# Patient Record
Sex: FEMALE | Race: BLACK OR AFRICAN AMERICAN | Employment: OTHER | URBAN - METROPOLITAN AREA
[De-identification: names, ages, dates, MRNs, and addresses within clinical notes are randomized per-mention and may not be internally consistent; named-entity substitution may affect disease eponyms.]

---

## 2017-01-06 ENCOUNTER — THERAPY VISIT (OUTPATIENT)
Dept: PHYSICAL THERAPY | Facility: CLINIC | Age: 62
End: 2017-01-06
Payer: COMMERCIAL

## 2017-01-06 DIAGNOSIS — M25.571 ACUTE RIGHT ANKLE PAIN: Primary | ICD-10-CM

## 2017-01-06 DIAGNOSIS — M65.28 CALCIFIC ACHILLES TENDONITIS: ICD-10-CM

## 2017-01-06 PROCEDURE — 97035 APP MDLTY 1+ULTRASOUND EA 15: CPT | Mod: GP | Performed by: PHYSICAL THERAPIST

## 2017-01-06 PROCEDURE — 97140 MANUAL THERAPY 1/> REGIONS: CPT | Mod: GP | Performed by: PHYSICAL THERAPIST

## 2017-01-06 PROCEDURE — 97110 THERAPEUTIC EXERCISES: CPT | Mod: GP | Performed by: PHYSICAL THERAPIST

## 2017-01-20 ENCOUNTER — THERAPY VISIT (OUTPATIENT)
Dept: PHYSICAL THERAPY | Facility: CLINIC | Age: 62
End: 2017-01-20
Payer: COMMERCIAL

## 2017-01-20 DIAGNOSIS — M25.571 ACUTE RIGHT ANKLE PAIN: Primary | ICD-10-CM

## 2017-01-20 DIAGNOSIS — M65.28 CALCIFIC ACHILLES TENDONITIS: ICD-10-CM

## 2017-01-20 PROCEDURE — 97035 APP MDLTY 1+ULTRASOUND EA 15: CPT | Mod: GP | Performed by: PHYSICAL THERAPIST

## 2017-01-20 PROCEDURE — 97110 THERAPEUTIC EXERCISES: CPT | Mod: GP | Performed by: PHYSICAL THERAPIST

## 2017-01-20 PROCEDURE — 97140 MANUAL THERAPY 1/> REGIONS: CPT | Mod: GP | Performed by: PHYSICAL THERAPIST

## 2017-02-02 ENCOUNTER — THERAPY VISIT (OUTPATIENT)
Dept: PHYSICAL THERAPY | Facility: CLINIC | Age: 62
End: 2017-02-02
Payer: COMMERCIAL

## 2017-02-02 DIAGNOSIS — M25.571 ACUTE RIGHT ANKLE PAIN: Primary | ICD-10-CM

## 2017-02-02 DIAGNOSIS — M65.28 CALCIFIC ACHILLES TENDONITIS: ICD-10-CM

## 2017-02-02 PROCEDURE — 97035 APP MDLTY 1+ULTRASOUND EA 15: CPT | Mod: GP | Performed by: PHYSICAL THERAPIST

## 2017-02-02 PROCEDURE — 97140 MANUAL THERAPY 1/> REGIONS: CPT | Mod: GP | Performed by: PHYSICAL THERAPIST

## 2017-02-02 PROCEDURE — 97110 THERAPEUTIC EXERCISES: CPT | Mod: GP | Performed by: PHYSICAL THERAPIST

## 2017-02-14 ENCOUNTER — THERAPY VISIT (OUTPATIENT)
Dept: PHYSICAL THERAPY | Facility: CLINIC | Age: 62
End: 2017-02-14
Payer: COMMERCIAL

## 2017-02-14 DIAGNOSIS — M65.28 CALCIFIC ACHILLES TENDONITIS: ICD-10-CM

## 2017-02-14 DIAGNOSIS — M25.571 ACUTE RIGHT ANKLE PAIN: ICD-10-CM

## 2017-02-14 PROCEDURE — 97112 NEUROMUSCULAR REEDUCATION: CPT | Mod: GP | Performed by: PHYSICAL THERAPIST

## 2017-02-14 PROCEDURE — 97140 MANUAL THERAPY 1/> REGIONS: CPT | Mod: GP | Performed by: PHYSICAL THERAPIST

## 2017-02-14 PROCEDURE — 97035 APP MDLTY 1+ULTRASOUND EA 15: CPT | Mod: GP | Performed by: PHYSICAL THERAPIST

## 2017-02-24 ENCOUNTER — THERAPY VISIT (OUTPATIENT)
Dept: PHYSICAL THERAPY | Facility: CLINIC | Age: 62
End: 2017-02-24
Payer: COMMERCIAL

## 2017-02-24 DIAGNOSIS — M65.28 CALCIFIC ACHILLES TENDONITIS: ICD-10-CM

## 2017-02-24 DIAGNOSIS — M25.571 ACUTE RIGHT ANKLE PAIN: ICD-10-CM

## 2017-02-24 PROCEDURE — 97112 NEUROMUSCULAR REEDUCATION: CPT | Mod: GP | Performed by: PHYSICAL THERAPIST

## 2017-02-24 PROCEDURE — 97035 APP MDLTY 1+ULTRASOUND EA 15: CPT | Mod: GP | Performed by: PHYSICAL THERAPIST

## 2017-02-24 PROCEDURE — 97140 MANUAL THERAPY 1/> REGIONS: CPT | Mod: GP | Performed by: PHYSICAL THERAPIST

## 2019-08-06 ENCOUNTER — THERAPY VISIT (OUTPATIENT)
Dept: PHYSICAL THERAPY | Facility: CLINIC | Age: 64
End: 2019-08-06
Payer: COMMERCIAL

## 2019-08-06 DIAGNOSIS — M54.50 LEFT-SIDED LOW BACK PAIN WITHOUT SCIATICA: ICD-10-CM

## 2019-08-06 DIAGNOSIS — M54.6 LEFT-SIDED THORACIC BACK PAIN: ICD-10-CM

## 2019-08-06 PROCEDURE — 97530 THERAPEUTIC ACTIVITIES: CPT | Mod: GP | Performed by: PHYSICAL THERAPIST

## 2019-08-06 PROCEDURE — 97161 PT EVAL LOW COMPLEX 20 MIN: CPT | Mod: GP | Performed by: PHYSICAL THERAPIST

## 2019-08-06 PROCEDURE — 97110 THERAPEUTIC EXERCISES: CPT | Mod: GP | Performed by: PHYSICAL THERAPIST

## 2019-08-06 NOTE — LETTER
[place] [facility] [place] [address] [facility] [address]  [phone]    2019    Re: Caroline Rashid   :   1955  MRN:  [id number]   REFERRING PHYSICIAN:   Gadiel Evans    [facility]  Date of Initial Evaluation:  19  Visits:  Rxs Used: 1  Reason for Referral:     Left-sided thoracic back pain  Left-sided low back pain without sciatica    EVALUATION SUMMARY  Lumbar Spine Evaluation  Physical Therapy Initial Examination/Evaluation 2019   Caroline Rashid is a 64 year old female referred to physical therapy by Dr. Gadiel Evans for treatment of Chronic L-sided Low Back Pain  with Precautions/Restrictions/MD instructions E&T up to 6 visits   Therapist Assessment:   Clinical Impression: Pt presents to [facility] with primary complaint of L-sided back pain.  Per clinical examination, pt with limited ROM and hesitancy to move. Pt does have decreased core and proximal mm strength.    Pt will benefit from skilled physical therapy for stretching and strengthening program as well as education on proper body mechanics to address above impairments and functional limitations.       Subjective:  Pt reports back pain has been present since . She had anxiety and panic attack at this time. Pain is in L thoracic back mostly.  DOI/onset: 2019   Mechanism of injury: Panic attack/anxiety    DOS NA   Related PMH: none  Previous treatment: PT for R ankle   Imaging: none    Chief Complaint: L sided back pain    Pain: rest 6 /10, activity 9/10  Described as: spreading out, can be sharp Alleviated by: warm towel, stretching, massage, muscle cream, shower  Exacerbated by: lifting movement  Progression of symptoms since initial onset: staying the same Time of day when pain is worse: night    Sleeping: Pain wakes her at night ; mostly side sleeper   Social history: , grandchildren   Occupation: Retired Job duties: housework     Current HEP/exercise regimen: walking, normal household activities   Patient's goals are Decrease pain, return to PLOF   Other pertinent PMH: Depression, htn, menopausal, migraines/headaches, sleep disorder, severe headaches, pain at night/rest General health as reported by patient: Fair    Return to MD: evita    Re: Caroline Rashid   :   1955    Static Posture  Foot: Pes planus/cavus: Slight planus on the R    Knee: Varus/Valgus: unremarkable     Hip: Adduction/IR: unremarkable     L foot in front so pt standing in rotated position     Dynamic Movement Screen  Single leg stance observations: required UE support   Double limb squat observations: NT    Trunk Range of Motion  Flexion: WNL  Extension: 50%  Side bendin% L, WNL to the R   Rotation: 50% bilat  Hamstring: hypertonic bilat    Hip and Knee Strength   MMT Hip Abduction Hip Extension Hip Flexion  Knee Flexion   Left 4-/5 NT 4/5 4+/5   Right 4-/5 NT 4/5 4+/5     Special Tests  Neural tension: Slump test + on the L   Reflexes: NT  Dermatomes: WNL  Myotomes: WNL    Assessment/Plan:    Patient is a 64 year old female with thoracic and lumbar complaints.    Patient has the following significant findings with corresponding treatment plan.                Diagnosis 1:  Chronic L-sided Low back Pain  Pain -  hot/cold therapy, manual therapy, self management, education and home program  Decreased ROM/flexibility - manual therapy, therapeutic exercise, therapeutic activity and home program  Decreased strength - therapeutic exercise, therapeutic activities and home program  Impaired balance - neuro re-education, therapeutic activities and home program  Impaired muscle performance - neuro re-education and home program  Decreased function - therapeutic activities and home program  Impaired posture - neuro re-education, therapeutic activities and home program    Therapy Evaluation Codes:   1) History comprised of:   Personal factors that impact the plan of care:       Anxiety, Living environment, Past/current experiences and Time since onset of symptoms.    Comorbidity factors that impact the plan of care are:      Depression, High blood pressure, Menopausal, Migraines/headaches and Pain at night/rest.    Re: Caroline Rashid   :   1955     Medications impacting care: High blood pressure, Muscle relaxant, Pain and Sleep.  2) Examination of Body Systems comprised of:   Body structures and functions that impact the plan of care:      Lumbar spine and Thoracic Spine.   Activity limitations that impact the plan of care are:      Bathing, Bending, Cooking, Dressing, Lifting, Sitting, Stairs, Standing, Walking and Sleeping.  3) Clinical presentation characteristics are:   Stable/Uncomplicated.  4) Decision-Making    Low complexity using standardized patient assessment instrument and/or measureable assessment of functional outcome.  Cumulative Therapy Evaluation is: Low complexity.    Previous and current functional limitations:  (See Goal Flow Sheet for this information)    Short term and Long term goals: (See Goal Flow Sheet for this information)     Communication ability:  Patient appears to be able to clearly communicate and understand verbal and written communication and follow directions correctly.  Treatment Explanation - The following has been discussed with the patient:   RX ordered/plan of care  Anticipated outcomes  Possible risks and side effects  This patient would benefit from PT intervention to resume normal activities.   Rehab potential is good.    Frequency:  1 X week, once daily  Duration:  for 4 weeks  Discharge Plan:  Achieve all LTG.  Independent in home treatment program.  Reach maximal therapeutic benefit.    Please refer to the daily flowsheet for treatment today, total treatment time and time spent performing 1:1 timed codes.     Thank you for your referral.    INQUIRIES  Therapist: Jyoti Brothers, PT, DPT  [facility]  [address] [address]  Phone: [phone]  Fax: [phone]

## 2019-08-06 NOTE — PROGRESS NOTES
[facility]  System  Physical Exam  General   ROS     Lumbar Spine Evaluation    Physical Therapy Initial Examination/Evaluation August 6, 2019   Caroline Rashid is a 64 year old female referred to physical therapy by Dr. Gadiel Evans for treatment of Chronic L-sided Low Back Pain  with Precautions/Restrictions/MD instructions E&T up to 6 visits   Therapist Assessment:   Clinical Impression: Pt presents to [facility] with primary complaint of L-sided back pain.  Per clinical examination, pt with limited ROM and hesitancy to move. Pt does have decreased core and proximal mm strength.    Pt will benefit from skilled physical therapy for stretching and strengthening program as well as education on proper body mechanics to address above impairments and functional limitations.       Subjective:  Pt reports back pain has been present since 6/19. She had anxiety and panic attack at this time. Pain is in L thoracic back mostly.  DOI/onset: 6/19/2019   Mechanism of injury: Panic attack/anxiety    DOS NA   Related PMH: none  Previous treatment: PT for R ankle   Imaging: none    Chief Complaint: L sided back pain    Pain: rest 6 /10, activity 9/10  Described as: spreading out, can be sharp Alleviated by: warm towel, stretching, massage, muscle cream, shower  Exacerbated by: lifting movement  Progression of symptoms since initial onset: staying the same Time of day when pain is worse: night    Sleeping: Pain wakes her at night ; mostly side sleeper   Social history: , grandchildren   Occupation: Retired Job duties: housework    Current HEP/exercise regimen: walking, normal household activities   Patient's goals are Decrease pain, return to PLOF   Other pertinent PMH: Depression, htn, menopausal, migraines/headaches, sleep disorder, severe headaches, pain at night/rest General health as reported by patient: Fair    Return to MD: prn      Static Posture  Foot: Pes planus/cavus: Slight planus on the R    Knee:  Varus/Valgus: unremarkable     Hip: Adduction/IR: unremarkable     L foot in front so pt standing in rotated position     Dynamic Movement Screen  Single leg stance observations: required UE support   Double limb squat observations: NT    Trunk Range of Motion  Flexion: WNL  Extension: 50%  Side bendin% L, WNL to the R   Rotation: 50% bilat  Hamstring: hypertonic bilat      Hip and Knee Strength   MMT Hip Abduction Hip Extension Hip Flexion  Knee Flexion   Left 4-/5 NT 4/5 4+/5   Right 4-/5 NT 4/5 4+/5     Special Tests  Neural tension: Slump test + on the L   Reflexes: NT  Dermatomes: WNL  Myotomes: WNL      Assessment/Plan:    Patient is a 64 year old female with thoracic and lumbar complaints.    Patient has the following significant findings with corresponding treatment plan.                Diagnosis 1:  Chronic L-sided Low back Pain  Pain -  hot/cold therapy, manual therapy, self management, education and home program  Decreased ROM/flexibility - manual therapy, therapeutic exercise, therapeutic activity and home program  Decreased strength - therapeutic exercise, therapeutic activities and home program  Impaired balance - neuro re-education, therapeutic activities and home program  Impaired muscle performance - neuro re-education and home program  Decreased function - therapeutic activities and home program  Impaired posture - neuro re-education, therapeutic activities and home program    Therapy Evaluation Codes:   1) History comprised of:   Personal factors that impact the plan of care:      Anxiety, Living environment, Past/current experiences and Time since onset of symptoms.    Comorbidity factors that impact the plan of care are:      Depression, High blood pressure, Menopausal, Migraines/headaches and Pain at night/rest.     Medications impacting care: High blood pressure, Muscle relaxant, Pain and Sleep.  2) Examination of Body Systems comprised of:   Body structures and functions that impact the  plan of care:      Lumbar spine and Thoracic Spine.   Activity limitations that impact the plan of care are:      Bathing, Bending, Cooking, Dressing, Lifting, Sitting, Stairs, Standing, Walking and Sleeping.  3) Clinical presentation characteristics are:   Stable/Uncomplicated.  4) Decision-Making    Low complexity using standardized patient assessment instrument and/or measureable assessment of functional outcome.  Cumulative Therapy Evaluation is: Low complexity.    Previous and current functional limitations:  (See Goal Flow Sheet for this information)    Short term and Long term goals: (See Goal Flow Sheet for this information)     Communication ability:  Patient appears to be able to clearly communicate and understand verbal and written communication and follow directions correctly.  Treatment Explanation - The following has been discussed with the patient:   RX ordered/plan of care  Anticipated outcomes  Possible risks and side effects  This patient would benefit from PT intervention to resume normal activities.   Rehab potential is good.    Frequency:  1 X week, once daily  Duration:  for 4 weeks  Discharge Plan:  Achieve all LTG.  Independent in home treatment program.  Reach maximal therapeutic benefit.    Please refer to the daily flowsheet for treatment today, total treatment time and time spent performing 1:1 timed codes.

## 2019-09-10 ENCOUNTER — THERAPY VISIT (OUTPATIENT)
Dept: PHYSICAL THERAPY | Facility: CLINIC | Age: 64
End: 2019-09-10
Payer: COMMERCIAL

## 2019-09-10 DIAGNOSIS — M54.6 LEFT-SIDED THORACIC BACK PAIN: ICD-10-CM

## 2019-09-10 DIAGNOSIS — M54.50 LEFT-SIDED LOW BACK PAIN WITHOUT SCIATICA: ICD-10-CM

## 2019-09-10 PROCEDURE — 97112 NEUROMUSCULAR REEDUCATION: CPT | Mod: GP | Performed by: PHYSICAL THERAPIST

## 2019-09-10 PROCEDURE — 97110 THERAPEUTIC EXERCISES: CPT | Mod: GP | Performed by: PHYSICAL THERAPIST

## 2019-09-23 ENCOUNTER — THERAPY VISIT (OUTPATIENT)
Dept: PHYSICAL THERAPY | Facility: CLINIC | Age: 64
End: 2019-09-23
Payer: COMMERCIAL

## 2019-09-23 DIAGNOSIS — M54.50 LEFT-SIDED LOW BACK PAIN WITHOUT SCIATICA: ICD-10-CM

## 2019-09-23 DIAGNOSIS — M54.6 LEFT-SIDED THORACIC BACK PAIN: ICD-10-CM

## 2019-09-23 PROCEDURE — 97112 NEUROMUSCULAR REEDUCATION: CPT | Mod: GP | Performed by: PHYSICAL THERAPIST

## 2019-09-23 PROCEDURE — 97110 THERAPEUTIC EXERCISES: CPT | Mod: GP | Performed by: PHYSICAL THERAPIST

## 2019-10-14 ENCOUNTER — THERAPY VISIT (OUTPATIENT)
Dept: PHYSICAL THERAPY | Facility: CLINIC | Age: 64
End: 2019-10-14
Payer: COMMERCIAL

## 2019-10-14 DIAGNOSIS — M54.50 LEFT-SIDED LOW BACK PAIN WITHOUT SCIATICA: ICD-10-CM

## 2019-10-14 DIAGNOSIS — M54.6 LEFT-SIDED THORACIC BACK PAIN: ICD-10-CM

## 2019-10-14 PROCEDURE — 97110 THERAPEUTIC EXERCISES: CPT | Mod: GP | Performed by: PHYSICAL THERAPIST

## 2019-10-14 PROCEDURE — 97112 NEUROMUSCULAR REEDUCATION: CPT | Mod: GP | Performed by: PHYSICAL THERAPIST

## 2019-10-29 ENCOUNTER — THERAPY VISIT (OUTPATIENT)
Dept: PHYSICAL THERAPY | Facility: CLINIC | Age: 64
End: 2019-10-29
Payer: COMMERCIAL

## 2019-10-29 DIAGNOSIS — M54.6 LEFT-SIDED THORACIC BACK PAIN: ICD-10-CM

## 2019-10-29 DIAGNOSIS — M54.50 LEFT-SIDED LOW BACK PAIN WITHOUT SCIATICA: ICD-10-CM

## 2019-10-29 PROCEDURE — 97530 THERAPEUTIC ACTIVITIES: CPT | Mod: GP | Performed by: PHYSICAL THERAPIST

## 2019-10-29 PROCEDURE — 97110 THERAPEUTIC EXERCISES: CPT | Mod: GP | Performed by: PHYSICAL THERAPIST

## 2019-11-11 ENCOUNTER — THERAPY VISIT (OUTPATIENT)
Dept: PHYSICAL THERAPY | Facility: CLINIC | Age: 64
End: 2019-11-11
Payer: COMMERCIAL

## 2019-11-11 DIAGNOSIS — M54.50 LEFT-SIDED LOW BACK PAIN WITHOUT SCIATICA: ICD-10-CM

## 2019-11-11 DIAGNOSIS — M54.6 LEFT-SIDED THORACIC BACK PAIN: ICD-10-CM

## 2019-11-11 PROCEDURE — 97110 THERAPEUTIC EXERCISES: CPT | Mod: GP | Performed by: PHYSICAL THERAPIST

## 2019-11-11 PROCEDURE — 97530 THERAPEUTIC ACTIVITIES: CPT | Mod: GP | Performed by: PHYSICAL THERAPIST

## 2019-11-11 NOTE — PROGRESS NOTES
[facility]      System  Physical Exam  General   ROS      DISCHARGE REPORT    Progress reporting period is from 2019 to 2019.       SUBJECTIVE  Subjective: Back pain is more intermittent than it was in the past. Often pain is related to weather. Exercises do seem to give her some relief.     Current Pain level: 4/10.      Initial Pain level: 9/10.   Changes in function:  Yes (See Goal flowsheet attached for changes in current functional level)  Adverse reaction to treatment or activity: None    OBJECTIVE  Oswestry Score: 22.22 %         Changes noted in objective findings:  Yes,       Trunk Range of Motion 19  Flexion: WNL  Extension: WNL-stretch   Side bending:  WNL  Rotation: WNL  Hamstring: hypertonic bilat         Hip and Knee Strength   19                    MMT Hip Abduction Hip Extension Hip Flexion    Left 4/5 NT 4/5   Right 4+/5 NT 4+/5         Trunk Range of Motion 2019  Flexion: WNL  Extension: 50%  Side bendin% L, WNL to the R   Rotation: 50% bilat  Hamstring: hypertonic bilat        Hip and Knee Strength    2019                    MMT Hip Abduction Hip Extension Hip Flexion  Knee Flexion   Left 4-/5 NT 4/5 4+/5   Right 4-/5 NT 4/5 4+/5       Objective: Reviewed HEP. Improved ROM and pain from start of PT.   Discussed recommendation to join gym to continue to build strength and endurance over the winter.      ASSESSMENT/PLAN    STG/LTGs have been met or progress has been made towards goals:  Yes (See Goal flow sheet completed today.)  Assessment of Progress: The patient's condition is improving.  Self Management Plans:  Patient has been instructed in a home treatment program.  Caroline continues to require the following intervention to meet STG and LTG's:  PT intervention is no longer required to meet STG/LTG.    Recommendations:  This patient is ready to be discharged from therapy and continue their home treatment program.    Please refer to the daily flowsheet for treatment  today, total treatment time and time spent performing 1:1 timed codes.

## 2019-11-11 NOTE — LETTER
[place] [facility] [place] [address] [facility] [place] [address]  [phone]    2019    Re: Caroline Rashid   :   1955  MRN:  [id number]   REFERRING PHYSICIAN:   Gadiel Evans    [facility]  Date of Initial Evaluation:  19  Visits:  Rxs Used: 6  Reason for Referral:     Left-sided thoracic back pain  Left-sided low back pain without sciatica    DISCHARGE REPORT  Progress reporting period is from 2019 to 2019.       SUBJECTIVE  Subjective: Back pain is more intermittent than it was in the past. Often pain is related to weather. Exercises do seem to give her some relief.     Current Pain level: /10.      Initial Pain level: /10.   Changes in function:  Yes (See Goal flowsheet attached for changes in current functional level)  Adverse reaction to treatment or activity: None    OBJECTIVE  Oswestry Score: 22.22 %         Changes noted in objective findings:  Yes,     Trunk Range of Motion 19  Flexion: WNL  Extension: WNL-stretch   Side bending:  WNL  Rotation: WNL  Hamstring: hypertonic bilat      Hip and Knee Strength   19                    MMT Hip Abduction Hip Extension Hip Flexion    Left 4/5 NT 4/5   Right 4+/5 NT 4+/5     Trunk Range of Motion 2019  Flexion: WNL  Extension: 50%  Side bendin% L, WNL to the R   Rotation: 50% bilat  Hamstring: hypertonic bilat      Hip and Knee Strength    2019                    MMT Hip Abduction Hip Extension Hip Flexion  Knee Flexion   Left 4-/5 NT 4/5 4+/5   Right 4-/5 NT 4/5 4+/5     Re: Caroline Rashid   :   1955    Objective: Reviewed HEP. Improved ROM and pain from start of PT.   Discussed recommendation to join gym to continue to build strength and endurance over the winter.      ASSESSMENT/PLAN  STG/LTGs have been met or progress has been made towards goals:  Yes (See Goal flow sheet completed today.)  Assessment of Progress: The patient's condition  is improving.  Self Management Plans:  Patient has been instructed in a home treatment program.  Caroline continues to require the following intervention to meet STG and LTG's:  PT intervention is no longer required to meet STG/LTG.    Recommendations:  This patient is ready to be discharged from therapy and continue their home treatment program.    Please refer to the daily flowsheet for treatment today, total treatment time and time spent performing 1:1 timed codes.    Thank you for your referral.    INQUIRIES  Therapist: Jyoti Brothers PT  [facility] [address] [address]  Phone: [phone]  Fax: [phone]

## 2020-09-21 ENCOUNTER — TRANSFERRED RECORDS (OUTPATIENT)
Dept: HEALTH INFORMATION MANAGEMENT | Facility: CLINIC | Age: 65
End: 2020-09-21

## 2020-09-23 ENCOUNTER — HOSPITAL ENCOUNTER (INPATIENT)
Facility: CLINIC | Age: 65
LOS: 7 days | Discharge: HOME HEALTH | DRG: 947 | End: 2020-09-30
Attending: PHYSICAL MEDICINE & REHABILITATION | Admitting: PHYSICAL MEDICINE & REHABILITATION
Payer: COMMERCIAL

## 2020-09-23 DIAGNOSIS — R53.81 DEBILITY: Primary | ICD-10-CM

## 2020-09-23 DIAGNOSIS — E11.69 TYPE 2 DIABETES MELLITUS WITH OTHER SPECIFIED COMPLICATION, UNSPECIFIED WHETHER LONG TERM INSULIN USE (H): ICD-10-CM

## 2020-09-23 DIAGNOSIS — Z86.16 HISTORY OF 2019 NOVEL CORONAVIRUS DISEASE (COVID-19): ICD-10-CM

## 2020-09-23 LAB — GLUCOSE BLDC GLUCOMTR-MCNC: 135 MG/DL (ref 70–99)

## 2020-09-23 PROCEDURE — 00000146 ZZHCL STATISTIC GLUCOSE BY METER IP

## 2020-09-23 PROCEDURE — 97166 OT EVAL MOD COMPLEX 45 MIN: CPT | Mod: GO | Performed by: OCCUPATIONAL THERAPIST

## 2020-09-23 PROCEDURE — 25000132 ZZH RX MED GY IP 250 OP 250 PS 637: Performed by: PHYSICIAN ASSISTANT

## 2020-09-23 PROCEDURE — 12800006 ZZH R&B REHAB

## 2020-09-23 PROCEDURE — 25000128 H RX IP 250 OP 636: Performed by: PHYSICIAN ASSISTANT

## 2020-09-23 PROCEDURE — 25000131 ZZH RX MED GY IP 250 OP 636 PS 637: Performed by: PHYSICIAN ASSISTANT

## 2020-09-23 RX ORDER — LANOLIN ALCOHOL/MO/W.PET/CERES
100 CREAM (GRAM) TOPICAL DAILY
Status: ON HOLD | COMMUNITY
End: 2020-09-30

## 2020-09-23 RX ORDER — METOPROLOL TARTRATE 25 MG/1
25 TABLET, FILM COATED ORAL 2 TIMES DAILY
Status: DISCONTINUED | OUTPATIENT
Start: 2020-09-23 | End: 2020-09-30 | Stop reason: HOSPADM

## 2020-09-23 RX ORDER — POTASSIUM CHLORIDE 1500 MG/1
20 TABLET, EXTENDED RELEASE ORAL DAILY
Status: ON HOLD | COMMUNITY
End: 2020-09-30

## 2020-09-23 RX ORDER — DULOXETIN HYDROCHLORIDE 20 MG/1
40 CAPSULE, DELAYED RELEASE ORAL DAILY
Status: DISCONTINUED | OUTPATIENT
Start: 2020-09-24 | End: 2020-09-30 | Stop reason: HOSPADM

## 2020-09-23 RX ORDER — LANOLIN ALCOHOL/MO/W.PET/CERES
6 CREAM (GRAM) TOPICAL AT BEDTIME
COMMUNITY

## 2020-09-23 RX ORDER — GUANFACINE 1 MG/1
0.5 TABLET ORAL 2 TIMES DAILY
Status: ON HOLD | COMMUNITY
End: 2020-09-30

## 2020-09-23 RX ORDER — VENLAFAXINE 37.5 MG/1
37.5 TABLET ORAL DAILY
Status: ON HOLD | COMMUNITY
End: 2020-09-30

## 2020-09-23 RX ORDER — DULOXETIN HYDROCHLORIDE 20 MG/1
20 CAPSULE, DELAYED RELEASE ORAL DAILY
Status: DISCONTINUED | OUTPATIENT
Start: 2020-09-24 | End: 2020-09-23

## 2020-09-23 RX ORDER — DEXTROSE MONOHYDRATE 25 G/50ML
25-50 INJECTION, SOLUTION INTRAVENOUS
Status: DISCONTINUED | OUTPATIENT
Start: 2020-09-23 | End: 2020-09-30 | Stop reason: HOSPADM

## 2020-09-23 RX ORDER — ACETAMINOPHEN 325 MG/1
650 TABLET ORAL EVERY 6 HOURS PRN
Status: DISCONTINUED | OUTPATIENT
Start: 2020-09-23 | End: 2020-09-30 | Stop reason: HOSPADM

## 2020-09-23 RX ORDER — LANOLIN ALCOHOL/MO/W.PET/CERES
100 CREAM (GRAM) TOPICAL DAILY
Status: DISCONTINUED | OUTPATIENT
Start: 2020-09-24 | End: 2020-09-30 | Stop reason: HOSPADM

## 2020-09-23 RX ORDER — LIDOCAINE 4 G/G
1 PATCH TOPICAL 3 TIMES DAILY
Status: ON HOLD | COMMUNITY
End: 2020-09-30

## 2020-09-23 RX ORDER — NICOTINE POLACRILEX 4 MG
15-30 LOZENGE BUCCAL
Status: DISCONTINUED | OUTPATIENT
Start: 2020-09-23 | End: 2020-09-30 | Stop reason: HOSPADM

## 2020-09-23 RX ORDER — POTASSIUM CHLORIDE 750 MG/1
20 TABLET, EXTENDED RELEASE ORAL DAILY
Status: DISCONTINUED | OUTPATIENT
Start: 2020-09-24 | End: 2020-09-29

## 2020-09-23 RX ORDER — HYDROXYZINE HYDROCHLORIDE 25 MG/1
25 TABLET, FILM COATED ORAL EVERY 6 HOURS PRN
Status: DISCONTINUED | OUTPATIENT
Start: 2020-09-23 | End: 2020-09-30 | Stop reason: HOSPADM

## 2020-09-23 RX ORDER — LANOLIN ALCOHOL/MO/W.PET/CERES
6 CREAM (GRAM) TOPICAL AT BEDTIME
Status: DISCONTINUED | OUTPATIENT
Start: 2020-09-23 | End: 2020-09-30 | Stop reason: HOSPADM

## 2020-09-23 RX ORDER — LORAZEPAM 0.5 MG/1
0.5 TABLET ORAL ONCE
Status: ON HOLD | COMMUNITY
End: 2020-09-30

## 2020-09-23 RX ORDER — FUROSEMIDE 20 MG
20 TABLET ORAL DAILY
Status: DISCONTINUED | OUTPATIENT
Start: 2020-09-24 | End: 2020-09-29

## 2020-09-23 RX ORDER — ACETAMINOPHEN 325 MG/1
650 TABLET ORAL EVERY 6 HOURS PRN
Status: ON HOLD | COMMUNITY
End: 2020-09-30

## 2020-09-23 RX ORDER — CALCIUM CARBONATE 500 MG/1
1 TABLET, CHEWABLE ORAL ONCE
Status: ON HOLD | COMMUNITY
End: 2020-09-30

## 2020-09-23 RX ORDER — TRAZODONE HYDROCHLORIDE 50 MG/1
50 TABLET, FILM COATED ORAL
Status: ON HOLD | COMMUNITY
End: 2020-09-30

## 2020-09-23 RX ORDER — QUETIAPINE FUMARATE 25 MG/1
25 TABLET, FILM COATED ORAL
Status: ON HOLD | COMMUNITY
End: 2020-09-30

## 2020-09-23 RX ORDER — MIRTAZAPINE 15 MG/1
15 TABLET, FILM COATED ORAL AT BEDTIME
Status: ON HOLD | COMMUNITY
End: 2020-09-30

## 2020-09-23 RX ORDER — LIDOCAINE 4 G/G
1-2 PATCH TOPICAL
Status: DISCONTINUED | OUTPATIENT
Start: 2020-09-24 | End: 2020-09-30 | Stop reason: HOSPADM

## 2020-09-23 RX ORDER — POLYETHYLENE GLYCOL 3350 17 G/17G
17 POWDER, FOR SOLUTION ORAL DAILY PRN
Status: DISCONTINUED | OUTPATIENT
Start: 2020-09-23 | End: 2020-09-30 | Stop reason: HOSPADM

## 2020-09-23 RX ORDER — FUROSEMIDE 20 MG
20 TABLET ORAL DAILY
Status: ON HOLD | COMMUNITY
End: 2020-09-30

## 2020-09-23 RX ORDER — MIRTAZAPINE 7.5 MG/1
15 TABLET, FILM COATED ORAL AT BEDTIME
Status: DISCONTINUED | OUTPATIENT
Start: 2020-09-23 | End: 2020-09-30 | Stop reason: HOSPADM

## 2020-09-23 RX ORDER — DULOXETIN HYDROCHLORIDE 20 MG/1
20 CAPSULE, DELAYED RELEASE ORAL DAILY
Status: ON HOLD | COMMUNITY
End: 2020-09-30

## 2020-09-23 RX ORDER — METOPROLOL TARTRATE 25 MG/1
25 TABLET, FILM COATED ORAL 2 TIMES DAILY
Status: ON HOLD | COMMUNITY
End: 2020-09-30

## 2020-09-23 RX ORDER — ASPIRIN 81 MG/1
81 TABLET, CHEWABLE ORAL DAILY
Status: ON HOLD | COMMUNITY
Start: 2020-09-22 | End: 2020-09-30

## 2020-09-23 RX ORDER — VENLAFAXINE 37.5 MG/1
37.5 TABLET ORAL DAILY
Status: DISCONTINUED | OUTPATIENT
Start: 2020-09-24 | End: 2020-09-23

## 2020-09-23 RX ORDER — INSULIN LISPRO 100 [IU]/ML
0-12 INJECTION, SOLUTION INTRAVENOUS; SUBCUTANEOUS EVERY 6 HOURS
Status: ON HOLD | COMMUNITY
End: 2020-09-30

## 2020-09-23 RX ORDER — ASPIRIN 81 MG/1
81 TABLET ORAL DAILY
Status: DISCONTINUED | OUTPATIENT
Start: 2020-09-24 | End: 2020-09-30 | Stop reason: HOSPADM

## 2020-09-23 RX ADMIN — Medication 0.5 MG: at 21:57

## 2020-09-23 RX ADMIN — ACETAMINOPHEN 650 MG: 325 TABLET, FILM COATED ORAL at 23:05

## 2020-09-23 RX ADMIN — MELATONIN TAB 3 MG 6 MG: 3 TAB at 21:57

## 2020-09-23 RX ADMIN — MIRTAZAPINE 15 MG: 7.5 TABLET, FILM COATED ORAL at 21:57

## 2020-09-23 RX ADMIN — INSULIN GLARGINE 20 UNITS: 100 INJECTION, SOLUTION SUBCUTANEOUS at 22:00

## 2020-09-23 RX ADMIN — ENOXAPARIN SODIUM 90 MG: 100 INJECTION SUBCUTANEOUS at 20:44

## 2020-09-23 ASSESSMENT — ACTIVITIES OF DAILY LIVING (ADL): PREVIOUS_RESPONSIBILITIES: MEAL PREP;HOUSEKEEPING;LAUNDRY;SHOPPING;MEDICATION MANAGEMENT;FINANCES;CHILD CARE

## 2020-09-23 ASSESSMENT — MIFFLIN-ST. JEOR: SCORE: 1422.24

## 2020-09-23 NOTE — PLAN OF CARE
Pt was admitted to unit around 1310 via W/C, accompanied by . Pt is alert and oriented but forgetful. VSS. Denies pain. Required CGA, gait belt walker for mobility. Continent of bowel and bladder. LBM today per report. Room and unit orientation completed and pt ambulated in pina x1 for unit tour.

## 2020-09-23 NOTE — H&P
"    [facility]   Acute Rehabilitation Unit  Admission History and Physical    CHIEF COMPLAINT   \"weakness\"    HISTORY OF PRESENT ILLNESS  Caroline Rashid is a 65 year old woman with a past medical history of htn, DMII, who was admitted 8/ for hypoxic resp failure 2/2 to [facility]. Was intubated, paralyzed and proned. Course complicated by worsening renal function as well as NSTEMI with +trops. Pt extubated 8/8 with position changing/proning. requiried emergen reintubation. Course further complicated by evidence of Aspergillus in sputum as well as yeast in blood, Joey,  prolonged hypoxic respiratory failure, encephalopathy. She was trached 8/26/20, transferred to [facility] for ongoing management 8/31. While at [facility] was de cannulated 9/15, encephalopathy improved and was transitioned to regular diet.      During her LTACH stay, patient was seen and evaluated by PT and OT, who collectively recommended that patient would benefit from ongoing therapies in the acute inpatient rehabilitation setting.       In review of the therapy notes she is currently noted to have impaired strength, cognition, and activity tolerance. She is currently completing seated grooming with cga, min assist with dressing, min assist with transfers. Scored 10/30 on MoCA with need fur further cognitive assessment. Is ambulating with cga with walker and need for rest breaks. Goals for MOD I with adls, mobility and improved cognitive abilities.    On arrival to rehab unit patient was seen with  present. She reports she is doing pretty well, denies n/v/d, sob, headache, dizziness, and fevers. She denies numbness/tingling,does report some intermittent right hand tremor, feels right side especially right upper extremity has been slightly weaker for several weeks.  feels this is likely due to positioning while in ICU.  She reports good appetite and motivation to get home.        PAST MEDICAL " HISTORY   Reviewed and updated in Epic.  Past Medical History:   Diagnosis Date     Anxiety      Diabetes type 2, uncontrolled (H)      Hypertension        SURGICAL HISTORY  Reviewed and updated in Epic.  No past surgical history on file.    SOCIAL HISTORY  Reviewed and updated in Epic.  Marital Status:   Living situation: lives in house in [place] with , daughter, and grand daughter.  There are total of 7 steps to enter their house, then everything is main level.  Laundry is in the basement but she does not need to go to the basement for now.  Family support: supportive.   Vocational History: retired  Tobacco use: none  Alcohol use: none  Illicit drug use: none      FAMILY HISTORY  Reviewed and updated in Epic.  No family history on file.      PRIOR FUNCTIONAL HISTORY   Pt was independent with all ADLs/IADLs, transfers, mobility and gait.      MEDICATIONS  Scheduled meds  Medications Prior to Admission   Medication Sig Dispense Refill Last Dose     aspirin (ASA) 81 MG chewable tablet Take 81 mg by mouth daily   9/22/2020 at 0950     calcium carbonate (TUMS) 500 MG chewable tablet Take 1 chew tab by mouth once   9/21/2020 at 2309     DULoxetine (CYMBALTA) 20 MG capsule Take 20 mg by mouth daily   9/22/2020 at 0951     enoxaparin ANTICOAGULANT (LOVENOX) 100 MG/ML syringe Inject 90 mg Subcutaneous every 12 hours   9/22/2020 at 2104     furosemide (LASIX) 20 MG tablet Take 20 mg by mouth daily   9/22/2020 at 0949     guaiFENesin (ROBITUSSIN) 100 MG/5ML SYRP Take 10 mLs by mouth every 4 hours as needed for cough   9/21/2020 at 0509     guanFACINE (TENEX) 1 MG tablet Take 0.5 mg by mouth 2 times daily   9/22/2020 at 2104     insulin glargine (LANTUS PEN) 100 UNIT/ML pen Inject 20 Units Subcutaneous At Bedtime   9/22/2020 at 2106     insulin lispro (HUMALOG KWIKPEN) 100 UNIT/ML (1 unit dial) KWIKPEN Inject 0-12 Units Subcutaneous every 6 hours   9/23/2020 at 0033     isavuconazonium Sulfate (CRESEMBA)  "186 MG CAPS capsule Take 372 mg by mouth daily   9/22/2020 at 1126     Lidocaine (LIDOCARE) 4 % Patch Place 1 patch onto the skin 3 times daily To prevent lidocaine toxicity, patient should be patch free for 12 hrs daily.   9/22/2020 at 0942     LORazepam (ATIVAN) 0.5 MG tablet Take 0.5 mg by mouth once   9/22/2020 at 1126     melatonin 3 MG tablet Take 6 mg by mouth At Bedtime   9/22/2020 at 2103     metoprolol tartrate (LOPRESSOR) 25 MG tablet Take 25 mg by mouth 2 times daily   9/22/2020 at 2105     mirtazapine (REMERON) 15 MG tablet Take 15 mg by mouth At Bedtime   9/22/2020 at 2119     potassium chloride ER (KLOR-CON M) 20 MEQ CR tablet Take 20 mEq by mouth daily   9/22/2020 at 0950     QUEtiapine (SEROQUEL) 25 MG tablet Take 25 mg by mouth nightly as needed   9/22/2020 at 2104     thiamine (B-1) 100 MG tablet Take 100 mg by mouth daily   9/22/2020 at 0950     traZODone (DESYREL) 50 MG tablet Take 50 mg by mouth nightly as needed for sleep   9/23/2020 at 0008     venlafaxine (EFFEXOR) 37.5 MG tablet Take 37.5 mg by mouth daily   9/22/2020 at 0952     acetaminophen (TYLENOL) 325 MG tablet Take 650 mg by mouth every 6 hours as needed for mild pain or fever   9/21/2020 at 0626       ALLERGIES   No Known Allergies      REVIEW OF SYSTEMS  A 10 point ROS was performed and negative unless otherwise noted in HPI.         PHYSICAL EXAM  VITAL SIGNS:  /70 (BP Location: Left arm)   Pulse 95   Temp 97.8  F (36.6  C) (Oral)   Resp 16   Ht 1.6 m (5' 3\")   Wt 90.3 kg (199 lb 1.6 oz)   SpO2 96%   BMI 35.27 kg/m    BMI:  Estimated body mass index is 35.27 kg/m  as calculated from the following:    Height as of this encounter: 1.6 m (5' 3\").    Weight as of this encounter: 90.3 kg (199 lb 1.6 oz).     General: awake alert nad  HEENT: face symmetric mmm, eomi   Pulmonary: non labored clear diminished on room air  Cardiovascular: rrr  Abdominal: soft non distended non tender  Extremities: warm, well perfused, no edema " in bilateral lower extremities, no tenderness in calves  MSK/neuro:   Mental Status:  alert and oriented   Cranial Nerves: grossly normal    Sensory: Normal to light touch in bilateral upper and lower extremities    Strength: 4/5 in all muscle groups of the upper and lower extremities, right shoulder abduction and forward flexion up to 90 degrees with good resistance within limited range, right ef,  with full rom though seemingly and reported weaker than left    Tone per modified Tanisha Scale: none   Abnormal movements: reports rue tremor (not visualized)    Coordination: No dysmetria on finger to nose b/l, and rapid alternating finger seem symetric.   Speech: clear slow   Cognition: answers questions appropriately reports some memory impairment.    Gait: up with walker with cga  Skin: former trach site well healed, visualized skin intact.       LABS  Glucose 111-204  .   BMP 9/21 reviewed WNL.   CBC 9/21 Hgb 11.1 WBC 5.7         IMPRESSION/PLAN:  Caroline Rashid is a 65 year old right hand dominant female who was hospitalized 8/1-8/31 for COVID 19, then [facility] for vent weaning and medical management 8/31-9/23. Course complicated by hypoxic respiratory failure,aspergillus PNA, NSTEMI, CHICA, encephalopathy. Admitted to acute rehab 9/23 for ongoing rehabilitation and medical management.    Past medical history significant for anxiety, DM type 2, htn, and HLD.       Admission to acute inpatient rehab 9/23 with debilitiy  Impairment group code: 16      1. PT, OT and SLP 60 minutes of each on a daily basis, in addition to rehab nursing and close management of physiatrist.      2. Impairment of ADL's: Noted to have impaired strength, cognition, and activity tolerance with goal for mod I to I with adls.     3. Impairment of mobility:  Noted to have impaired strength, cognition, and activity tolerance with goal for mod I to I with mobility    4. Impairment of cognition/language/swallow:  Scored 10/30 on  MoCA with noted impaired memory will benefit from formal cognitive linguistic evaluation.      5. Medical Conditions  Prolonged acute respiratory failure-2/2 aspergillus and COVID.   - s/p trach 8/26 S/p decannulation 9/15  -oxygen to keep sats >90%  -wean as tolerated    COVID pneumonia  - Received remdesivir, Dexamethasone  -DVT prophylaxis as below       Aspergillus pneumonia.  CT Chest 9/21 per report with persistent fungal PNA, Case has been D/w Dr Ferrera ID from [facility] until 10/8/20  -  f/u ID clinic.     Metabolic encephalopathy  History of panic disorder, anxiety  - Prior to hospitalization on gabapentin qhs, hydroxyzine prn anxiety. Duloxetine 40 mg daily, trazodone at bedtime prn sleep, remeron 15 mg qhs. Medications titrated given encephalopathy, anxiety, and NPO status during hospitalization.   - continue cymbalta (increase to home dose 40 mg daily), discontinue effexor (now taking oral meds)   -continue remeron, melatonin, prn trazodone  -continue tenex (started for anxiety)- taper to at bedtime and continue to taper off  -psychology consult for emotional support  -consider simplification regimen/ psychiatry consult.      Right hand tremor- family reports as intermittent, new since hospital stay unclear etiology 9/22  could be anxiety related, per chart review has been on propanolol in past though no clear documentation or reported history of essential tremor.     Right shoulder limited range of motion/right upper extremity weakness- active range of motion with abduction and forward flexion at the right shoulder is limited up to 90 degrees, passive range of motion is normal.  She does not have any pain associated with this, and no sensory loss.  Less likely neurogenic and possibly due to some mechanical injury/rotator cuff tendinopathy.  Will continue therapies for now and consider referral to sports medicine/orthopedic team for more evaluation including repeat exam, and shoulder  MRI.     Chronic rt parietal ischemia- noted on CT head 9/22  -continue asa  -secondary stroke prevention     NSTEMI: during initial hospitalization while acutely ill    Echo 8/14 with normal LVEF 60%.   - ASA  - Metoprolol BID  - Lasix  Daily       Diabetes type 2- prior to admission on metformin 1000 mg bid. Did not check bg prior to admission. A1C 9.6% 8/3/30.  - Lantus 20 units at bedtime and ssi     HTN- prior to admission on lisinopril 40 mg daily, propanolol 40 mg q am,   continue lasix 20 mg daily, metoprolol 25 mg bid  -monitor bp        6. Adjustment to disability:  Clinical psychology to eval and treat  7. FEN: reg  8. Bowel: continent  9. Bladder: continent  10. DVT Prophylaxis: Lovenox at 90mg BID (start on 9/4, written by pulmonologist) for 29 days till 9/30/20   11. GI Prophylaxis: reg diet  12. Code: full  13. Disposition: home  14. ELOS: 7-8 days.  15. Rehab prognosis:  good  16. Follow up Appointments on Discharge: pcp, infectious disease    Discussed with Dr. Elkins , PM&R staff physician     Danii Caputo PA-C  Rehab Service  Pager [phone]      Physician Attestation   I, Elaine Elkins, saw and evaluated Caroline Rashid as part of a shared visit.  I have reviewed and discussed with the advanced practice provider their history, physical and plan.    I personally reviewed the vital signs, medications, labs and imaging.    My key history or physical exam findings:   Key management decisions made by me:   Caroline Rashid is a 66 yo female who was admitted to ARU with debility following prolonged and complicated hospitalization for COVID-19 infection.    Comorbid medical conditions being managed: Anemia, encephalopathy with residual cognitive deficits, STEMI, CHICA, aspiration pneumonia/sepsis acute on chronic tremor, right upper extremity weakness and limited range of motion, diabetes, hypertension and anxiety    She was independent with all aspects of her life. She is currently noted to have impaired  strength, cognition, and activity tolerance. She is currently completing seated grooming with cga, min assist with dressing, min assist with transfers. Scored 10/30 on MoCA with need fur further cognitive assessment. Is ambulating with cga with walker and need for rest breaks.     Anticipate improvement to modified level with basic ADLs and mobility; she might need supervision/assistance for IADLs pending her course and cognitive deficits.    Will benefit from intensive rehabilitation includin minutes each of PT, OT and SLP  Rehabilitation nursing  Close management by physiatry    Good rehab potentials; at risk of more medical complications. Estimated length of stay is 8 days.      Elaine Elkins  Date of Service (when I saw the patient): 20

## 2020-09-24 ENCOUNTER — APPOINTMENT (OUTPATIENT)
Dept: OCCUPATIONAL THERAPY | Facility: CLINIC | Age: 65
End: 2020-09-24
Payer: COMMERCIAL

## 2020-09-24 ENCOUNTER — APPOINTMENT (OUTPATIENT)
Dept: SPEECH THERAPY | Facility: CLINIC | Age: 65
End: 2020-09-24
Payer: COMMERCIAL

## 2020-09-24 ENCOUNTER — APPOINTMENT (OUTPATIENT)
Dept: PHYSICAL THERAPY | Facility: CLINIC | Age: 65
End: 2020-09-24
Attending: PHYSICIAN ASSISTANT
Payer: COMMERCIAL

## 2020-09-24 LAB
ALBUMIN SERPL-MCNC: 3.1 G/DL (ref 3.4–5)
ALP SERPL-CCNC: 133 U/L (ref 40–150)
ALT SERPL W P-5'-P-CCNC: 75 U/L (ref 0–50)
ANION GAP SERPL CALCULATED.3IONS-SCNC: 9 MMOL/L (ref 3–14)
AST SERPL W P-5'-P-CCNC: 40 U/L (ref 0–45)
BASOPHILS # BLD AUTO: 0 10E9/L (ref 0–0.2)
BASOPHILS NFR BLD AUTO: 0.7 %
BILIRUB DIRECT SERPL-MCNC: 0.1 MG/DL (ref 0–0.2)
BILIRUB SERPL-MCNC: 0.3 MG/DL (ref 0.2–1.3)
BUN SERPL-MCNC: 15 MG/DL (ref 7–30)
CALCIUM SERPL-MCNC: 9.4 MG/DL (ref 8.5–10.1)
CHLORIDE SERPL-SCNC: 107 MMOL/L (ref 94–109)
CO2 SERPL-SCNC: 25 MMOL/L (ref 20–32)
CREAT SERPL-MCNC: 0.76 MG/DL (ref 0.52–1.04)
DIFFERENTIAL METHOD BLD: ABNORMAL
EOSINOPHIL # BLD AUTO: 0.2 10E9/L (ref 0–0.7)
EOSINOPHIL NFR BLD AUTO: 4.5 %
ERYTHROCYTE [DISTWIDTH] IN BLOOD BY AUTOMATED COUNT: 15.8 % (ref 10–15)
GFR SERPL CREATININE-BSD FRML MDRD: 82 ML/MIN/{1.73_M2}
GLUCOSE BLDC GLUCOMTR-MCNC: 120 MG/DL (ref 70–99)
GLUCOSE BLDC GLUCOMTR-MCNC: 132 MG/DL (ref 70–99)
GLUCOSE BLDC GLUCOMTR-MCNC: 151 MG/DL (ref 70–99)
GLUCOSE BLDC GLUCOMTR-MCNC: 180 MG/DL (ref 70–99)
GLUCOSE SERPL-MCNC: 125 MG/DL (ref 70–99)
HCT VFR BLD AUTO: 34.2 % (ref 35–47)
HGB BLD-MCNC: 11.3 G/DL (ref 11.7–15.7)
IMM GRANULOCYTES # BLD: 0 10E9/L (ref 0–0.4)
IMM GRANULOCYTES NFR BLD: 0.2 %
LMWH PPP CHRO-ACNC: 1.16 IU/ML
LYMPHOCYTES # BLD AUTO: 1.8 10E9/L (ref 0.8–5.3)
LYMPHOCYTES NFR BLD AUTO: 41.8 %
MCH RBC QN AUTO: 29.4 PG (ref 26.5–33)
MCHC RBC AUTO-ENTMCNC: 33 G/DL (ref 31.5–36.5)
MCV RBC AUTO: 89 FL (ref 78–100)
MONOCYTES # BLD AUTO: 0.3 10E9/L (ref 0–1.3)
MONOCYTES NFR BLD AUTO: 6.8 %
NEUTROPHILS # BLD AUTO: 2 10E9/L (ref 1.6–8.3)
NEUTROPHILS NFR BLD AUTO: 46 %
NRBC # BLD AUTO: 0 10*3/UL
NRBC BLD AUTO-RTO: 0 /100
PLATELET # BLD AUTO: 228 10E9/L (ref 150–450)
POTASSIUM SERPL-SCNC: 4 MMOL/L (ref 3.4–5.3)
PROT SERPL-MCNC: 6.9 G/DL (ref 6.8–8.8)
RBC # BLD AUTO: 3.85 10E12/L (ref 3.8–5.2)
SODIUM SERPL-SCNC: 141 MMOL/L (ref 133–144)
WBC # BLD AUTO: 4.4 10E9/L (ref 4–11)

## 2020-09-24 PROCEDURE — 97535 SELF CARE MNGMENT TRAINING: CPT | Mod: GO | Performed by: OCCUPATIONAL THERAPIST

## 2020-09-24 PROCEDURE — 36415 COLL VENOUS BLD VENIPUNCTURE: CPT | Performed by: PHYSICIAN ASSISTANT

## 2020-09-24 PROCEDURE — 92523 SPEECH SOUND LANG COMPREHEN: CPT | Mod: GN

## 2020-09-24 PROCEDURE — 85520 HEPARIN ASSAY: CPT | Performed by: PHYSICAL MEDICINE & REHABILITATION

## 2020-09-24 PROCEDURE — 97530 THERAPEUTIC ACTIVITIES: CPT | Mod: GP

## 2020-09-24 PROCEDURE — 97530 THERAPEUTIC ACTIVITIES: CPT | Mod: GO | Performed by: OCCUPATIONAL THERAPIST

## 2020-09-24 PROCEDURE — 80076 HEPATIC FUNCTION PANEL: CPT | Performed by: PHYSICIAN ASSISTANT

## 2020-09-24 PROCEDURE — 36415 COLL VENOUS BLD VENIPUNCTURE: CPT | Performed by: PHYSICAL MEDICINE & REHABILITATION

## 2020-09-24 PROCEDURE — 97110 THERAPEUTIC EXERCISES: CPT | Mod: GP

## 2020-09-24 PROCEDURE — 97110 THERAPEUTIC EXERCISES: CPT | Mod: GO | Performed by: OCCUPATIONAL THERAPIST

## 2020-09-24 PROCEDURE — 85025 COMPLETE CBC W/AUTO DIFF WBC: CPT | Performed by: PHYSICIAN ASSISTANT

## 2020-09-24 PROCEDURE — 80048 BASIC METABOLIC PNL TOTAL CA: CPT | Performed by: PHYSICIAN ASSISTANT

## 2020-09-24 PROCEDURE — 25000131 ZZH RX MED GY IP 250 OP 636 PS 637: Performed by: PHYSICIAN ASSISTANT

## 2020-09-24 PROCEDURE — 97161 PT EVAL LOW COMPLEX 20 MIN: CPT | Mod: GP

## 2020-09-24 PROCEDURE — 12800006 ZZH R&B REHAB

## 2020-09-24 PROCEDURE — 25000128 H RX IP 250 OP 636: Performed by: PHYSICIAN ASSISTANT

## 2020-09-24 PROCEDURE — 25000132 ZZH RX MED GY IP 250 OP 250 PS 637: Performed by: PHYSICIAN ASSISTANT

## 2020-09-24 PROCEDURE — 00000146 ZZHCL STATISTIC GLUCOSE BY METER IP

## 2020-09-24 RX ADMIN — MIRTAZAPINE 15 MG: 7.5 TABLET, FILM COATED ORAL at 21:17

## 2020-09-24 RX ADMIN — ASPIRIN 81 MG: 81 TABLET, COATED ORAL at 08:15

## 2020-09-24 RX ADMIN — MELATONIN TAB 3 MG 6 MG: 3 TAB at 21:17

## 2020-09-24 RX ADMIN — POTASSIUM CHLORIDE 20 MEQ: 750 TABLET, EXTENDED RELEASE ORAL at 08:14

## 2020-09-24 RX ADMIN — ENOXAPARIN SODIUM 70 MG: 80 INJECTION SUBCUTANEOUS at 21:20

## 2020-09-24 RX ADMIN — DULOXETINE HYDROCHLORIDE 40 MG: 20 CAPSULE, DELAYED RELEASE ORAL at 08:15

## 2020-09-24 RX ADMIN — THIAMINE HCL TAB 100 MG 100 MG: 100 TAB at 08:14

## 2020-09-24 RX ADMIN — METFORMIN HYDROCHLORIDE 500 MG: 500 TABLET ORAL at 18:08

## 2020-09-24 RX ADMIN — ISAVUCONAZONIUM SULFATE 372 MG: 186 CAPSULE ORAL at 08:16

## 2020-09-24 RX ADMIN — LIDOCAINE 1 PATCH: 560 PATCH PERCUTANEOUS; TOPICAL; TRANSDERMAL at 08:19

## 2020-09-24 RX ADMIN — INSULIN ASPART 1 UNITS: 100 INJECTION, SOLUTION INTRAVENOUS; SUBCUTANEOUS at 12:06

## 2020-09-24 RX ADMIN — METOPROLOL TARTRATE 25 MG: 25 TABLET, FILM COATED ORAL at 21:17

## 2020-09-24 RX ADMIN — FUROSEMIDE 20 MG: 20 TABLET ORAL at 08:14

## 2020-09-24 RX ADMIN — METOPROLOL TARTRATE 25 MG: 25 TABLET, FILM COATED ORAL at 08:22

## 2020-09-24 RX ADMIN — ENOXAPARIN SODIUM 90 MG: 100 INJECTION SUBCUTANEOUS at 08:14

## 2020-09-24 NOTE — PLAN OF CARE
Patient has been able to participate with therapies, and has been able to use her call light appropriately.  She is able to walk in her room to the bathroom with use of a walker, and stand by assistance.         She voices that she did not have Diabetes before going in to the hospital. She would benefit from patient education on Diabetes.  Currently monitoring BGs, covering with sliding scale, with Lantus dosing in the evening, as well as oral hypoglycemic  to be given with supper

## 2020-09-24 NOTE — PLAN OF CARE
FOCUS/GOAL  Medical management    ASSESSMENT, INTERVENTIONS AND CONTINUING PLAN FOR GOAL:  Pt is alert and oriented. Verbalized headache from earlier in the evening has subsided. Assist of 1 with walker. Continent of bladder using toilet in the bathroom. Pt able to complete nicki cares independently. 0200 . Used call light appropriately and appeared to sleep well overnight.

## 2020-09-24 NOTE — PLAN OF CARE
Alert and oriented x4. Continent of bowel and bladder in toilet. Last BM 9/23. Transfers with assist of 1 with walker. QID blood sugars. Regular thin diet. On 2L oxygen with a nasal cannula. Bruising on arms and abdomen noted. Reported a headache around 11pm. Ice and acetaminophen given. Pt states it feels like a migraine headache to her, which she does get occasionally. Bed alarm on for safety, call light within reach, Ok to continue with care plan.

## 2020-09-24 NOTE — PHARMACY-MEDICATION REGIMEN REVIEW
Pharmacy Medication Regimen Review  Caroline Rashid is a 65 year old female who is currently in the Acute Rehab Unit.    Assessment: All medications have an appropriate indications, durations and no unnecessary use was found.  Enoxaparin: 70 mg subQ q12h - dosing regimen per pulmonologist per care everywhere.  Isavuvonazonium sulfate: until 10/8/20 (ID appointment)  Guanfacine: will be tapered off per H&P  PTA Lisinopril and propanolol- not restarted, currently on metoprolol and furosemide.    Plan:   Continue current treatment.  Attending provider will be sent this note for review.  If there are any emergent issues noted above, pharmacist will contact provider directly by phone.      Pharmacy will periodically review the resident's medication regimen for any PRN medications not administered in > 72 hours and discontinue them. The pharmacist will discuss gradual dose reductions of psychopharmacologic medications with interdisciplinary team on a regular basis.    Please contact pharmacy if the above does not answer specific medication questions/concerns.    Background:  A pharmacist has reviewed all medications and pertinent medical history today.  Medications were reviewed for appropriate use and any irregularities found are listed with recommendations.      Current Facility-Administered Medications:      acetaminophen (TYLENOL) tablet 650 mg, 650 mg, Oral, Q6H PRN, Danii Caputo PA, 650 mg at 09/23/20 2305     aspirin EC tablet 81 mg, 81 mg, Oral, Daily, Danii Caputo PA, 81 mg at 09/24/20 0815     glucose gel 15-30 g, 15-30 g, Oral, Q15 Min PRN **OR** dextrose 50 % injection 25-50 mL, 25-50 mL, Intravenous, Q15 Min PRN **OR** glucagon injection 1 mg, 1 mg, Subcutaneous, Q15 Min PRN, Danii Caputo PA     DULoxetine (CYMBALTA) DR capsule 40 mg, 40 mg, Oral, Daily, Danii Caputo PA, 40 mg at 09/24/20 0815     enoxaparin ANTICOAGULANT (LOVENOX) injection 90 mg, 90 mg, Subcutaneous, Q12H, Harshal  LOBO Marinelli, 90 mg at 09/24/20 0814     furosemide (LASIX) tablet 20 mg, 20 mg, Oral, Daily, Danii Caputo PA, 20 mg at 09/24/20 0814     guanFACINE (TENEX) half-tab 0.5 mg, 0.5 mg, Oral, At Bedtime, Danii Caputo PA, 0.5 mg at 09/23/20 2157     hydrOXYzine (ATARAX) tablet 25 mg, 25 mg, Oral, Q6H PRN, Danii Caputo PA     insulin aspart (NovoLOG) injection (RAPID ACTING), 1-7 Units, Subcutaneous, TID AC, Danii Caputo PA, Stopped at 09/23/20 1746     insulin aspart (NovoLOG) injection (RAPID ACTING), 1-5 Units, Subcutaneous, At Bedtime, Danii Caputo PA, Stopped at 09/23/20 2151     insulin glargine (LANTUS PEN) injection 20 Units, 20 Units, Subcutaneous, At Bedtime, Danii Caputo PA, 20 Units at 09/23/20 2200     isavuconazonium Sulfate (CRESEMBA) capsule CAPS 372 mg, 372 mg, Oral, Daily, Danii Caputo PA, 372 mg at 09/24/20 0816     Lidocaine (LIDOCARE) 4 % Patch 1-2 patch, 1-2 patch, Transdermal, Q24H, Danii Caputo PA, 1 patch at 09/24/20 0819     lidocaine patch in PLACE, , Transdermal, Q8H, Danii Caputo PA     melatonin tablet 6 mg, 6 mg, Oral, At Bedtime, Danii Caputo PA, 6 mg at 09/23/20 2157     metoprolol tartrate (LOPRESSOR) tablet 25 mg, 25 mg, Oral, BID, Danii Caputo PA, 25 mg at 09/24/20 0822     mirtazapine (REMERON) tablet TABS 15 mg, 15 mg, Oral, At Bedtime, Danii Caputo PA, 15 mg at 09/23/20 2157     Patient is already receiving anticoagulation with heparin, enoxaparin (LOVENOX), warfarin (COUMADIN)  or other anticoagulant medication, , Does not apply, Continuous PRN, Danii Caputo PA     polyethylene glycol (MIRALAX) Packet 17 g, 17 g, Oral, Daily PRN, Danii Caputo PA     potassium chloride ER (KLOR-CON M) CR tablet 20 mEq, 20 mEq, Oral, Daily, Danii Caputo PA, 20 mEq at 09/24/20 0814     thiamine (B-1) tablet 100 mg, 100 mg, Oral, Daily, Danii Caputo PA, 100 mg at 09/24/20 0814     traZODone  (DESYREL) half-tab 25-50 mg, 25-50 mg, Oral, At Bedtime PRN, Danii Caputo, PA  No current outpatient prescriptions on file.   PMH: Prolonged acute respiratory failure-2/2 aspergillus and COVID, s/p trach 8/26, S/p decannulation 9/15, aspergillus PNA, NSTEMI, CHICA, encephalopathy, anxiety, DM type 2, HTN, HLD, Right hand tremor, Right shoulder limited range of motion/right upper extremity weakness, and Chronic rt parietal ischemia

## 2020-09-24 NOTE — PROGRESS NOTES
09/24/20 1200   Quick Adds   Type of Visit Initial PT Evaluation   Living Environment   Lives With spouse;child(justin), adult   Living Arrangements house   Home Accessibility stairs to enter home;stairs within home   Number of Stairs, Main Entrance 7   Transportation Anticipated family or friend will provide   Living Environment Comment PT: Patient reporting lives with her ,adult daughter and her grandchildren. Her  will be around during the day to provide assist.   Self-Care   Usual Activity Tolerance excellent   Current Activity Tolerance fair   Regular Exercise No   Equipment Currently Used at Home none   Activity/Exercise/Self-Care Comment PT:Patient is not a formal exerciser. Will do housework and yard work for activity. Has no ADs at home   Functional Level Prior   Ambulation 0-->independent   Transferring 0-->independent   Toileting 0-->independent   Bathing 0-->independent   Communication 0-->understands/communicates without difficulty   Swallowing 0-->swallows foods/liquids without difficulty   Cognition 0 - no cognition issues reported   Fall history within last six months no   Which of the above functional risks had a recent onset or change? ambulation;transferring   Prior Functional Level Comment PT: IND with mobility prior to admission. Never used an AD>   General Information   Onset of Illness/Injury or Date of Surgery - Date 09/23/20   Referring Physician Danii Caputo PA    Pertinent History of Current Problem (include personal factors and/or comorbidities that impact the POC) Caroline Rashid is a 65 year old woman with a past medical history of htn, DMII, who was admitted 8/ for hypoxic resp failure 2/2 to [facility]. Was intubated, paralyzed and proned. Course complicated by worsening renal function as well as NSTEMI with +trops. Pt extubated 8/8 with position changing/proning. requiried emergen reintubation. Course further complicated by evidence of Aspergillus in sputum as well  as yeast in blood, Joey,  prolonged hypoxic respiratory failure, encephalopathy. She was trached 8/26/20, transferred to [facility] for ongoing management 8/31. While at [facility] was de cannulated 9/15, encephalopathy improved and was transitioned to regular diet. Now in ARU setting to progress strength,f unctional mobility, activity tolerance and safety   Precautions/Limitations fall precautions   Cognitive Status Examination   Orientation orientation to person, place and time   Level of Consciousness alert   Follows Commands and Answers Questions 100% of the time;able to follow multistep instructions   Personal Safety and Judgment intact   Memory intact   Pain Assessment   Patient Currently in Pain No   Posture    Posture Comments PT: Rounded shoulders, forward head.   Range of Motion (ROM)   ROM Comment PT: Not formally tested,  WNL for observed mobility.    ARC Assessment Only   Acute Rehab Functional Assessment See IP Rehab Daily Documentation Flowsheet for Functional Mobility/ADL Assessment   Bed Mobility   Bed Mobility Comments PT: IND for supine <>sit   Transfer Skills   Transfer Comments PT: SBA for sit<>Stand with 4WW.   Gait   Gait Comments PT: Patient ambualted ~2x200' with 4WW and SBA. Good stability with gai.t   Balance   Balance Comments PT: Seated balance safe and stable. STanding balance with 4WW safe and stable.    General Therapy Interventions   Planned Therapy Interventions balance training;ADL retraining;bed mobility training;gait training;neuromuscular re-education;orthotic fitting/training;ROM;strengthening;stretching;transfer training;risk factor education;home program guidelines;progressive activity/exercise   Clinical Impression   Criteria for Skilled Therapeutic Intervention yes, treatment indicated   PT Diagnosis Impaired functional mobility   Influenced by the following impairments Impaired balance,congition, safety awareness, reduced activity tolerance, deconditioning   Functional  limitations due to impairments Bed mobility, transfers, gait, activity tolerance for community mobility, stairs   Clinical Presentation Stable/Uncomplicated   Clinical Decision Making (Complexity) Low complexity   Therapy Frequency Daily   Predicted Duration of Therapy Intervention (days/wks) 8-10 days   Anticipated Discharge Disposition Home with Assist   Risk & Benefits of therapy have been explained Yes   Patient, Family & other staff in agreement with plan of care Yes   Clinical Impression Comments PT evaluation complete,treatment initiated.    Total Evaluation Time   Total Evaluation Time (Minutes) 10     Miley Beyer PT, DPT  [employer]  [email or web address]  Pager: [phone]

## 2020-09-24 NOTE — PLAN OF CARE
Discharge Planner PT   Patient plan for discharge: Home with HHPT. ~7-8 days for anticipated LOS  Current status: PT IND for supine<>sit and rolling. Initially CGA, progress to SBA for sit<>stand transfers with 4WW. Toilet transfers with CGA-SBA, IND for LBD. Patient ambulated 2x200 ' in halls with CGA, good stability, limited by fatigue. Negotiated 2x8 stairs with B railings and CGA, fatigued.Overall patient moving fairly well and good stability with 4WW, very limited by fatigue/ reduced activity tolerance.  Barriers to return to prior living situation: Decreased activity tolerance, deconditioning, stabiltiy  Recommendations for discharge: Home with assist and HHPT  Rationale for recommendations: HHPT after discharge to improve functional mobility, strength and independence.       Entered by: Miley Beyer 09/24/2020 5:18 PM

## 2020-09-24 NOTE — PROGRESS NOTES
09/24/20 0950   General Information, SLP   Type of Evaluation Cognitive-Linguistic   Type of Visit Initial   Start of Care Date 09/24/20   Onset of Illness/Injury or Date of Surgery - Date 08/01/20   Referring Physician LOBO Caputo   Patient/Family Goals Statement To get stronger and more independent   Pertinent History of Current Problem Caroline Rashid is a 65 year old woman with a past medical history of htn, DMII, who was admitted 8/ for hypoxic resp failure 2/2 to [facility]. Was intubated, paralyzed and proned. Course complicated by worsening renal function as well as NSTEMI with +trops. Pt extubated 8/8 with position changing/proning. requiried emergen reintubation. Course further complicated by evidence of Aspergillus in sputum as well as yeast in blood, Joey,  prolonged hypoxic respiratory failure, encephalopathy. She was trached 8/26/20, transferred to [facility] for ongoing management 8/31. While at [facility] was de cannulated 9/15, encephalopathy improved and was transitioned to regular diet.      General Info Comments Patient seen by speech therapy in acute care hospitalization for dysphagia management with all goals being met. Referred to speech therapy at rehab center for further cognitive evaluation due to concerns with memory.   Speech   Speech Comments Some /r/ distortions present during casual conversation.    Language: Auditory Comprehension (understanding of spoken language)   Two Step Commands (out of 5 total) 3   Yes/No Sentence and Simple Paragraph; Crawford Diagnostic Aphasia Exam 3 short (out of 6 total) 3   Commands; Crawford Diagnostic Aphasia Exam 3 (out of 15 total) 12   Functional Assessment Scale (Auditory Comprehension) Mild Impairment   Comments (Auditory Comprehension) Patient required frequent clarification of instructions during tasks.  As tasks became more complex, patient had increased difficulty. Comprehension is likely exacerbated by cognitive impairments.   Language:  Verbal Expression (use of spoken language to express information)   Responsive Naming; Lisbon Diagnostic Aphasia Exam 3 (out of 20 total) 16   Generative Naming Score; Cognitive Linguistic Quick Test 4   Generative Naming; Cognitive Linguistic Quick Test Result Below mean   Functional Assessment Scale (Verbal Expression) Minimal Impairment   Comments (Verbal Expression) No word finding difficulties evident in casual conversation but noticing some deficits in formal tasks such as in responsive naming.   Reading Comprehension (understanding of written language)   Sentences and Paragraphs; Lisbon Diagnostic Aphasia Exam (out of 10 total) 5   Comments (Reading Comprehension) Patient demonstrated difficulty with a variety of difficulty levels, particularly with the more complex paragraphs.   Written Expression (use of writing to express information)   Functional Assessment Scale (Written Expression) Not Tested (see Comment)   Pragmatics (the social or functional use of a language)   Comments (Pragmatics) Patient engaged in appropriate humor and conversation, however, wanted to continue with nonessential tasks despite therapy arrival.   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;RBANS   Standardized cognitive-linguistic assessment completed to be completed during future session   Cognitive Status Exam Comments Patient's language function adequate for more formal cognitive assessment   General Therapy Interventions   Planned Therapy Interventions Cognitive Treatment   Clinical Impression, SLP Eval   Criteria for Skilled Therapeutic Interventions Met Yes   SLP Diagnosis Moderate Cognitive-Linguistic Impairment   Influenced by the following factors/impairments COVID, hypoxia, and hx of CVA    Functional limitations due to impairments Decreased level of independence in IADLs.   Rehab Potential Good, to achieve stated therapy goals   Rehab potential affected by Positive family support, motivation to improve, improving medical status   Therapy Frequency Daily   Predicted Duration of Therapy Intervention (days/wks) 1 weeks   Anticipated Discharge Disposition Home with Home Therapy   Risks and Benefits of Treatment have been explained. Yes   Patient, Family & other staff in agreement with plan of care Yes   Clinical Impression Comments Patient presenting with moderate cognitive linguistic impairment.  Most significant impairments in short term memory, attention, reading comprehension, and executive functioning as demonstrated by difficulty recalling basic information from a short paragraph, flexibility in numerical tasks, and following multi-step commands. Do not suspect that current cognitive function will be a significant barrier to discharge though patient may benefit from additional oversight with higher level IADL tasks. Will continue to get more insight into her impairments through further cognitive-linguistic assessment. Patient motivated to continue improving and working on any areas being recommended.  Current level of function is likely below her baseline level and patient would benefit from skilled intervention to continue maximizing function and facilitate successful return to home.   Total Evaluation Time      Total Evaluation Time (Minutes) 55

## 2020-09-24 NOTE — H&P
Post Admission Physician Evaluation:    I have compared Caroline Rashid's condition on admission to acute rehabilitation to that outlined in the preadmission screen. History and physical exam performed by me. No significant differences are identified and the patient remains appropriate for an inpatient rehabilitation facility level of care to manage medical issues and address functional impairments due to (rehabilitation diagnosis) debility following prolonged and complicated hospitalization for COVID-19 infection.    Comorbid medical conditions being managed: Anemia, encephalopathy with residual cognitive deficits, STEMI, CHICA, aspiration pneumonia/sepsis acute on chronic tremor, right upper extremity weakness and limited range of motion, diabetes, hypertension and anxiety    Prior functional level: She was independent with all aspects of her life    Present function: she is currently noted to have impaired strength, cognition, and activity tolerance. She is currently completing seated grooming with cga, min assist with dressing, min assist with transfers. Scored 10/30 on MoCA with need fur further cognitive assessment. Is ambulating with cga with walker and need for rest breaks.     Anticipated rehabilitation course: Improvement to modified level with basic ADLs and mobility; she might need supervision/assistance for IADLs pending her course and cognitive deficits.    Will benefit from intensive rehabilitation includin minutes each of PT, OT and SLP  Rehabilitation nursing  Close management by physiatry    Prognosis: Good rehab potentials; at risk of more medical complications    Estimated length of stay: 8 days    Elaine Elkins MD  Physical Medicine & Rehabilitation

## 2020-09-24 NOTE — PROGRESS NOTES
Discharge Planner OT   Patient plan for discharge: Home with HC OT services. Anticipating ~8 day LOS  Current status: Pt requires SBA/CGA with STS with FWW. CGA with functional mobility with FWW in BR ~10ft 2x with cues to safely maneuver FWW. Pt requires CGA with toileting with FWW with cues for safe approach and SBA with G/H tasks standing at EOS with FWW. Completed the MOCA with pt scoring 15/30 with deficits in visuospatial/executive functioning, attention, language, and abstraction skills.    Barriers to return to prior living situation: Impaired cognition, deconditioning and debility, and impaired balance  Recommendations for discharge: Pt may need oversight with IADLs d/t impaired cognition upon discharge. AE/DME: shower chair vs extended tub bench and grab bars.  Rationale for recommendations: To increase IND and safety with ADLs/IADLs and functional mobility.        Entered by: Katerina Madrid 09/24/2020 11:46 AM

## 2020-09-24 NOTE — CONSULTS
MIMA--Pt  Marty was listed as the designated visitor upon admission. Mention that pt dtr Beccasirisha was at bedside as well. During assessment, dtr was present. Discussed visitor restrictions, dtr stated that she and her family discussed and would prefer for Adri to be the designated visitor moving forward. Updated PM charge RN, bedside RN and sticky note.     Also, pt and pt dtr prefer that pt son Cali be contacted first, Adri second and  Marty third if needed. Cali is most available. Adri, Cali and Amarilys would all three like to be updated by the team prior to discharge. Discussed team rounds. Suspect that pt may discharge before team rounds are schedule. Notified Adri that the team would update them over the phone prior to discharge. Adri aware and agreeable to this plan.      20 1600   Living Arrangements   Lives With spouse;child(justin), adult;grandchild(justin)   Living Arrangements house   Able to Return to Prior Arrangements yes   Living Arrangement Comments 6 CHERYL with railing on the right. Bed/bath on main level.    Home Safety   Patient Feels Safe Living in Home? yes   Discharge Planning   Expected Discharge Date 10/01/20   Patient/Family Anticipates Transition to home with family   Disposition Comments HHC vs OP, prefer HHC due to new insulin and further education    Concerns to be Addressed no discharge needs identified;denies needs/concerns at this time   Plan   Plan Home   Patient/Family in Agreement with Plan yes   Plan Comments HHC vs OP pending progress    Discharge Needs Assessment   Transportation Anticipated family or friend will provide       Social Work: Initial Assessment with Discharge Plan    Patient Name: Caroline Rashid  : 1955  Age: 65 year old  MRN: [id number]  Completed assessment with: Pt and pt dtr Adri at bedside   Admitted to ARU: 2020    Presenting Information   Date of SW assessment: 2020  [facility]  Directive: Will bring in copy and Power of  for Health Care--pt believes that she has completed a POA with her son but unsure. Dtr will follow up.   Primary Health Care Agent: Patient/self pending further evaluation   Secondary Health Care Agent: Pt  MALINA. Although pt and pt stated that they prefer Cali (son) as primary contact, Pj (dtr) as secondary contact and Marty () as third contact.   Living Situation: Pt lives in a home with 6 CHERYL, railing on the R. Bed/bath on the main level. Pt lives with  (retired), dtr (works as a RN full-time) and 17 y/o granddaughter (in school, all virtual). Pt dtr shared that the tub is high and a concern of the family, discussed possible transfer shower bench and that OT would discuss further and provide recommendations.   Previous Functional Status: Pt stated that she was indep PTA. Pt stated that she managed her medications and finances on her own. Pt stated that she was not driving and family assist with this. Family also assist with household chores, laundry, cooking and shopping. Pt denied falls. No hired in help or community services at this time.   DME available: None. Interested in getting a shower bench and grab bars in the bathroom. Asked about home eval prior to discharge. Discussed possible home measurements if therapy feels necessary.  Patient and family understanding of hospitalization: Appropriate. Appeared to look to dtr to verify information. Unclear about cognition.   Cultural/Language/Spiritual Considerations: -american woman, english speaking, Episcopal not listed.       Physical Health  Reason for admission: Debility--Caroline Rashid is a 65 year old woman with a past medical history of htn, DMII, who was admitted 8/ for hypoxic resp failure 2/2 to [facility]. Was intubated, paralyzed and proned. Course complicated by worsening renal function as well as NSTEMI with +trops. Pt extubated 8/8 with position changing/proning.  "requiried emergen reintubation. Course further complicated by evidence of Aspergillus in sputum as well as yeast in blood, Joey,  prolonged hypoxic respiratory failure, encephalopathy. She was trached 8/26/20, transferred to [facility] for ongoing management 8/31. While at [facility] was de cannulated 9/15, encephalopathy improved and was transitioned to regular diet.      Provider Information   Primary Care Physician:Gadiel Evans--Last saw 6 months ago. PREFER F/U [facility] ON WED/THURS due to family schedule. Information added to AVS for HUC notification.   [address] [address]  PH: [phone]  : None reported     Mental Health/Chemical Dependency:   Diagnosis: Anxiety. Pt stated her mood is stable since admission. Good family support. Adjusting to new setting. Anxious to discharge home.   Alcohol/Tobacco/Narcotis: Denied   Support/Services in Place: None reported   Services Needed/Recommended: Spiritual care and health psych available by consult   Sexuality/Intimacy: not discussed     Support System  Marital Status: . Marty is retired, able to assist at discharge. \"Doing well\" per pt report.   Family support: Pt has adult children who live local. Shared that Cali, Adri and Amarilys are her primary contacts. Supportive and involved in pt care.   Other support available: Grandchildren     Community Resources  Current in home services: Pt denied   Previous services: Pt denied. Dtr later shared that she has been to [facility] in the past and prefer to do OP if appropriate. Later shared that HHC may be more beneficial just due to being new on insulin but eventually would like to return to [facility] for OP. Aware final recs are pending.     Financial/Employment/Education  Employment Status: Retired  Income Source: Social security disability   Education: 12th grade   Financial Concerns:  Denied   Insurance: [employer] [place]--Insurance ELOISA Vidal PH: " [phone]      Discharge Plan   Patient and family discharge goal: Home 10/01 with family support and HHC vs OP  Provided Education on discharge plan: YES  Patient agreeable to discharge plan:  YES  Provided education and attained signature for Medicare IM and IRF Patient Rights and Privacy Information provided to patient : N/A  Provided patient with [place] [facility] Resources: N/A   Barriers to discharge: None identified     Discharge Recommendations   Disposition: See above   Transportation Needs: Family   Name of Transportation Company and Phone: N/A     Additional comments   Pt appeared pleasant and cooperative. Reported being anxious but has good support and mood stable. Anxious to discharge home. Dtr asked about home assessment prior to discharge. Shared that therapy would discuss home set up prior to discharge and give recommendations but no assessment prior to discharge. Discussed eval from home therapy if HHC recommended. Initially pt and dtr shared preference with OP therapy at discharge but later prefer HHC due to pt being new on insulin. Concerned that  will not be able to assist with this. PLC requested, SW made note that family would like dtr Amarilys to be present for that education.     Pt dtr asked about referral to [facility]. SW looked up the information and informed dtr that that service may be closed due to the pandemic but that there is contact information listed for naga Navarro Ph: [phone] and that SW would follow up and update family on status and referral.     Pt dtr has  contact information. Pt given  business card as well. Family wants to be involved in care and getting updates. SW will continue to follow.     Please invite to Care Conference:  See emergency contact list       JENNIFER Garcia, [facility] Acute Rehab Unit   Phone: [phone]  I   Pager: [phone]

## 2020-09-24 NOTE — PROGRESS NOTES
"  [facility]   Acute Rehabilitation Unit  Daily progress note    INTERVAL HISTORY  Caroline Rashid was seen and examined at bedside.  No acute events overnight.  Denies issues with bowel or bladder.  Denies chest pain, shortness of breath, no fever or chills.  Wants flu shot prior to discharge. Feels weak and deconditioned but looking forward to starting the therapy program.         Functionally, evals today       ROS: 10 point ROS neg other than the symptoms noted above in the HPI.          MEDICATIONS  Scheduled meds    aspirin  81 mg Oral Daily     DULoxetine  40 mg Oral Daily     enoxaparin ANTICOAGULANT  70 mg Subcutaneous Q12H     furosemide  20 mg Oral Daily     guanFACINE  0.5 mg Oral At Bedtime     insulin aspart  1-7 Units Subcutaneous TID AC     insulin aspart  1-5 Units Subcutaneous At Bedtime     insulin glargine  15 Units Subcutaneous At Bedtime     isavuconazonium Sulfate  372 mg Oral Daily     lidocaine  1-2 patch Transdermal Q24H     lidocaine   Transdermal Q8H     melatonin  6 mg Oral At Bedtime     metFORMIN  500 mg Oral BID w/meals     metoprolol tartrate  25 mg Oral BID     mirtazapine  15 mg Oral At Bedtime     potassium chloride  20 mEq Oral Daily     vitamin B1  100 mg Oral Daily       PRN meds:  acetaminophen, glucose **OR** dextrose **OR** glucagon, hydrOXYzine, - MEDICATION INSTRUCTIONS -, polyethylene glycol, traZODone      PHYSICAL EXAM  /77 (BP Location: Right arm)   Pulse 97   Temp 97.5  F (36.4  C) (Oral)   Resp 16   Ht 1.6 m (5' 3\")   Wt 90.3 kg (199 lb 1.6 oz)   SpO2 96%   BMI 35.27 kg/m    Gen: NAD, sitting at bedside, pleasent  HEENT: NCAT, EOMI  Cardio:  +s1+s2, 2+ radial pulse  Pulm: nonlabored, Clear  Abd: soft, nontender  Ext: no calf tendernss, no edema  Neuro/MSK: AAOX3, appears to have full AROM and PROM, with some limitation in RUE..  There is proximal weakness.     LABS    Lab Results   Component Value Date    WBC 4.4 " 09/24/2020     Lab Results   Component Value Date    RBC 3.85 09/24/2020     Lab Results   Component Value Date    HGB 11.3 09/24/2020     Lab Results   Component Value Date    HCT 34.2 09/24/2020     Lab Results   Component Value Date    MCV 89 09/24/2020     Lab Results   Component Value Date    MCH 29.4 09/24/2020     Lab Results   Component Value Date    MCHC 33.0 09/24/2020     Lab Results   Component Value Date    RDW 15.8 09/24/2020     Lab Results   Component Value Date     09/24/2020       Last Comprehensive Metabolic Panel:  Sodium   Date Value Ref Range Status   09/24/2020 141 133 - 144 mmol/L Final     Potassium   Date Value Ref Range Status   09/24/2020 4.0 3.4 - 5.3 mmol/L Final     Chloride   Date Value Ref Range Status   09/24/2020 107 94 - 109 mmol/L Final     Carbon Dioxide   Date Value Ref Range Status   09/24/2020 25 20 - 32 mmol/L Final     Anion Gap   Date Value Ref Range Status   09/24/2020 9 3 - 14 mmol/L Final     Glucose   Date Value Ref Range Status   09/24/2020 125 (H) 70 - 99 mg/dL Final     Urea Nitrogen   Date Value Ref Range Status   09/24/2020 15 7 - 30 mg/dL Final     Creatinine   Date Value Ref Range Status   09/24/2020 0.76 0.52 - 1.04 mg/dL Final     GFR Estimate   Date Value Ref Range Status   09/24/2020 82 >60 mL/min/[1.73_m2] Final     Comment:     Non  GFR Calc  Starting 12/18/2018, serum creatinine based estimated GFR (eGFR) will be   calculated using the Chronic Kidney Disease Epidemiology Collaboration   (CKD-EPI) equation.       Calcium   Date Value Ref Range Status   09/24/2020 9.4 8.5 - 10.1 mg/dL Final         ASSESSMENT AND PLAN    Caroline Rashid is a 65 year old right hand dominant female who was hospitalized 8/1-8/31 for COVID 19, then [facility] for vent weaning and medical management 8/31-9/23. Course complicated by hypoxic respiratory failure,aspergillus PNA, NSTEMI, CHICA, encephalopathy. Admitted to acute rehab 9/23 for ongoing rehabilitation  and medical management.     Past medical history significant for anxiety, DM type 2, htn, and HLD.         Admission to acute inpatient rehab 9/23 with debilitiy  Impairment group code: 16        1. PT, OT and SLP 60 minutes of each on a daily basis, in addition to rehab nursing and close management of physiatrist.       2. Impairment of ADL's: Noted to have impaired strength, cognition, and activity tolerance with goal for mod I to I with adls.      3. Impairment of mobility:  Noted to have impaired strength, cognition, and activity tolerance with goal for mod I to I with mobility     4. Impairment of cognition/language/swallow:  Scored 10/30 on MoCA with noted impaired memory will benefit from formal cognitive linguistic evaluation.       5. Medical Conditions  Prolonged acute respiratory failure-2/2 aspergillus and COVID.   - s/p trach 8/26 S/p decannulation 9/15  -oxygen to keep sats >90%  -wean as tolerated     COVID pneumonia  - Received remdesivir, Dexamethasone  -DVT prophylaxis as below        Aspergillus pneumonia.  CT Chest 9/21 per report with persistent fungal PNA, Case has been D/w Dr Ferrera ID from [facility] until 10/8/20  -  f/u ID clinic.     Metabolic encephalopathy  History of panic disorder, anxiety  - Prior to hospitalization on gabapentin qhs, hydroxyzine prn anxiety. Duloxetine 40 mg daily, trazodone at bedtime prn sleep, remeron 15 mg qhs. Medications titrated given encephalopathy, anxiety, and NPO status during hospitalization.   - continue cymbalta (increase to home dose 40 mg daily), discontinue effexor (now taking oral meds)   -continue remeron, melatonin, prn trazodone  -continue tenex (started for anxiety)- taper to at bedtime and continue to taper off  -psychology consult for emotional support  -consider simplification regimen/ psychiatry consult.      Right hand tremor- family reports as intermittent, new since hospital stay unclear etiology 9/22  could be anxiety  related, per chart review has been on propanolol in past though no clear documentation or reported history of essential tremor.      Right shoulder limited range of motion/right upper extremity weakness- active range of motion with abduction and forward flexion at the right shoulder is limited up to 90 degrees, passive range of motion is normal.  She does not have any pain associated with this, and no sensory loss.  Less likely neurogenic and possibly due to some mechanical injury/rotator cuff tendinopathy.  Will continue therapies for now and consider referral to sports medicine/orthopedic team for more evaluation including repeat exam, and shoulder MRI.     Chronic rt parietal ischemia- noted on CT head 9/22  -continue asa  -secondary stroke prevention      NSTEMI: during initial hospitalization while acutely ill    Echo 8/14 with normal LVEF 60%.   - ASA  - Metoprolol BID  - Lasix  Daily       Diabetes type 2- prior to admission on metformin 1000 mg bid. Did not check bg prior to admission. A1C 9.6% 8/3/30.  - Lantus 20 units at bedtime and ssi     HTN- prior to admission on lisinopril 40 mg daily, propanolol 40 mg q am,   continue lasix 20 mg daily, metoprolol 25 mg bid  -monitor bp          6. Adjustment to disability:  Clinical psychology to eval and treat  7. FEN: reg  8. Bowel: continent  9. Bladder: continent  10. DVT Prophylaxis: Lovenox at 90mg BID (start on 9/4, written by pulmonologist) for 29 days till 9/30/20   11. GI Prophylaxis: reg diet  12. Code: full  13. Disposition: home  14. ELOS: 7-8 days.  15. Rehab prognosis:  good  16. Follow up Appointments on Discharge: pcp, infectious disease           Avel Medrano, DO  Physical Medicine & Rehabilitation    I spent a total of 25 minutes face to face and coordinating care of Caroline Rashid.  Over 50% of my time on the unit was spent counseling the patient and /or coordinating care regarding debility post Covid.

## 2020-09-24 NOTE — PROGRESS NOTES
"CLINICAL NUTRITION SERVICES - ASSESSMENT NOTE     Nutrition Prescription    RECOMMENDATIONS FOR MDs/PROVIDERS TO ORDER:  None     Malnutrition Status:    Unable to determine due to no NFPA    Recommendations already ordered by Registered Dietitian (RD):  None today - RD will monitor for diet eduction needs prior to discharge from ARU unit     Future/Additional Recommendations:  Monitor meal intakes  Monitor weight trends  Monitor for diet education needs      REASON FOR ASSESSMENT  Josué Calhoun is a/an 65 year old female assessed by the dietitian for Admission Nutrition Risk Screen for new/uncontrolled diabetes and RN Consult - Newly diabetic    NUTRITION/MEDICAL HISTORY  Per chart review: Pt admits to ARU in the setting of s/p critical illness from COVID-19, hospitalized 8/1-8/31 for COVID 19, then [facility] for vent weaning and medical management 8/31-9/23. Hospital course complicated by hypoxic respiratory failure, aspergillus PNA, NSTEMI, CHICA, encephalopathy. Past medical history noted for DM2, anxiety, and HTN.     No face to face visit in light of reduced in-person exposure during COVID outbreak. It is noted that DM2 is not a new diagnosis per chart review. RD will plan to monitor for education needs prior to discharge from ARU.     CURRENT NUTRITION ORDERS  Diet: Regular  Intake/Tolerance: % thus far per flow sheets     LABS  Per MD notes: A1C 9.6% 8/3  Results for JOSUÉ CALHOUN (MRN [id number]) as of 9/24/2020 15:31   9/23/2020 21:44 9/24/2020 01:48 9/24/2020 06:16 9/24/2020 11:11   Glucose 135 (H) 151 (H) 125 (H) 180 (H)     MEDICATIONS  Cymbalta, Remeron, medium insulin resistance correction dosing, Lantus, Metformin, Tenex, Lasix, Klor-con, Thiamine    ANTHROPOMETRICS  Height: 160 cm (5' 3\")  Most Recent Weight: 90.3 kg (199 lb 1.6 oz)    IBW: 52.3 kg  BMI: Obesity Grade II BMI 35-39.9  Weight History:   89.9 kg (198 lb 3.1 oz) 08/28/2020 Per CE     101.2 kg (223 lb) 03/02/2020 Per CE "     Weight assessment: Significant 10.8% weight loss in 6 months.     Dosing Weight: 61.8 kg - adjusted BW     ASSESSED NUTRITION NEEDS  Estimated Energy Needs: 5581-2348 kcals/day (25 - 30 kcals/kg)  Justification: Maintenance/obese  Estimated Protein Needs: 60 grams protein/day (1 grams of pro/kg)  Justification: Maintenance  Estimated Fluid Needs: 4194-9083 mL/day (30 - 35 mL/kg)   Justification: Maintenance    MALNUTRITION  % Intake: No decreased intake noted  % Weight Loss: Up to 10% in 6 months (non-severe)  Subcutaneous Fat Loss: Unable to assess  Muscle Loss: Unable to assess  Fluid Accumulation/Edema: None noted  Malnutrition Diagnosis: Unable to determine due to no NFPA    NUTRITION DIAGNOSIS  Predicted food- and nutrition-related knowledge deficit related to therapeutic diet recommendations as evidenced by lab values above       INTERVENTIONS  Implementation  Nutrition Education: RD will monitor for need for diabetic education      Goals  -Patient will verbalize understanding of therapeutic diet recommendations     -Patient to consume % of nutritionally adequate meal trays TID, or the equivalent with supplements/snacks.     Monitoring/Evaluation  Progress toward goals will be monitored and evaluated per protocol.    Beverley Yee RD, LD  ARU RD Pager: [phone]

## 2020-09-24 NOTE — PROGRESS NOTES
09/23/20 1345   Quick Adds   Type of Visit Initial Occupational Therapy Evaluation   Living Environment   Lives With child(justin), adult;spouse   Living Arrangements house   Home Accessibility stairs to enter home   Number of Stairs, Main Entrance 7   Transportation Anticipated family or friend will provide   Living Environment Comment OT: Pt lives with spouse in a 1 level home, 7 steps to enter, living spaces on main level. Bathroom set up: standard toilet, tubshower no grab bars. Bedroom set up: standard bed, no rails or moveable features. Spouse is retired and able to assist as needed.   Self-Care   Usual Activity Tolerance moderate   Current Activity Tolerance fair   Regular Exercise No   Equipment Currently Used at Home none   Activity/Exercise/Self-Care Comment OT: No formal exercise program. Pt retired. Pt reports that she enjoys walking.    Functional Level   Ambulation 0-->independent   Transferring 0-->independent   Toileting 0-->independent   Bathing 0-->independent   Dressing 0-->independent   Eating 0-->independent   Communication 0-->understands/communicates without difficulty   Swallowing 0-->swallows foods/liquids without difficulty   Cognition 0 - no cognition issues reported   Fall history within last six months no   Which of the above functional risks had a recent onset or change? none   Prior Functional Level Comment OT: Pt IND ADLs and mobility. Pt shared responsibility of IADLs with spouse.        Present no   Language English   General Information   Onset of Illness/Injury or Date of Surgery - Date 08/01/20   Referring Physician Elaine Elkins MD    Patient/Family Goals Statement Pt goal to improve strength and walking   Additional Occupational Profile Info/Pertinent History of Current Problem Caroline Rashid is a 65 year old right hand dominant female who was hospitalized 8/1-8/31 for COVID 19, then [facility] for vent weaning and medical management 8/31-9/23. Course  complicated by hypoxic respiratory failure,aspergillus PNA, NSTEMI, CHICA, encephalopathy. Admitted to acute rehab 9/23 for ongoing rehabilitation and medical management. Past medical history significant for anxiety, DM type 2, htn, and HLD.    Precautions/Limitations fall precautions   Weight-Bearing Status - LUE full weight-bearing   Weight-Bearing Status - RUE full weight-bearing   Weight-Bearing Status - LLE full weight-bearing   Weight-Bearing Status - RLE full weight-bearing   Heart Disease Risk Factors Overweight;Medical history;Lack of physical activity   General Info Comments Spouse present for evaluation, involved in patient cares.   Cognitive Status Examination   Orientation person;place  (cues for date)   Level of Consciousness alert   Follows Commands (Cognition) delayed response/completion   Memory impaired   Attention Distractible during evaluation   Organization/Problem Solving Sequencing impaired;Problem solving impaired   Executive Function Initiation impaired;Cognitive flexibility impaired;Planning ability impaired   Cognitive Comment Hx of encephalopathy during hospital stay. Scoring 10/30 on MoCA during hospital admission. Slower processing and response to questions and directions. Recall impaired. Spouse frequently speaking for pt, will need to further assess cognition from functional standpoint. MOCA 9/24/20: 15/30    Visual Perception   Visual Perception Wears glasses   Visual Perception Comments OT: visual tracking and convergence intact. impaired peripheral vision bilaterally. Pt wears reading glasses   Sensory Examination   Sensory Comments OT: light touch intact with BUE's   Pain Assessment   Patient Currently in Pain No   Integumentary/Edema   Integumentary/Edema no deficits were identifed   Posture   Posture forward head position;protracted shoulders   Range of Motion (ROM)   ROM Comment RUE active sh flex/abd limited to 90*, slight tremor on R side. Passive motion intact. LUE AROM, RUE  elbow/wrist/hand ROM intact.    Strength   Strength Comments BUE strength 4/5 with exception of R shoulder flex/abd limited to 90*.    Hand Strength   Left hand  (pounds) 18 pounds   Right hand  (pounds) 21 pounds   Muscle Tone Assessment   Muscle Tone Quick Adds No deficits were identified   Coordination   Left hand, nine hole peg test (seconds) 1 min 3 seconds   Right hand, nine hole peg test (seconds) 50   Functional Limitations Decreased speed;Object transport impaired   Coordination Comments BUE distal tremor causing difficulty with transferring items   ARC Assessment Only   Acute Rehab Functional Assessment See IP Rehab Daily Documentation Flowsheet for Functional Mobility/ADL Assessment   Transfer Skill: Bed to Chair/Chair to Bed   Level of [place]: Bed to Chair contact guard   Physical Assist/Nonphysical Assist: Bed to Chair 1 person assist   Weight-Bearing Restrictions full weight-bearing   Assistive Device - Transfer Skill Bed to Chair Chair to Bed Rehab Eval rolling walker   Transfer Skill: Sit to Stand   Level of [place]: Sit/Stand stand-by assist   Physical Assist/Nonphysical Assist: Sit/Stand supervision   Transfer Skill: Sit to Stand full weight-bearing   Assistive Device for Transfer: Sit/Stand rolling walker   Transfer Skill: Toilet Transfer   Level of [place]: Toilet contact guard   Physical Assist/Nonphysical Assist: Toilet 1 person assist   Weight-Bearing Restrictions: Toilet full weight-bearing   Assistive Device rolling walker   Instrumental Activities of Daily Living (IADL)   Previous Responsibilities meal prep;housekeeping;laundry;shopping;medication management;finances;   Activities of Daily Living Analysis   Impairments Contributing to Impaired Activities of Daily Living balance impaired;cognition impaired;coordination impaired;fear and anxiety;postural control impaired;ROM decreased;strength decreased   General Therapy Interventions   Planned Therapy  Interventions ADL retraining;IADL retraining;balance training;bed mobility training;cognition;motor coordination training;ROM;strengthening;stretching;home program guidelines;progressive activity/exercise   Clinical Impression   Criteria for Skilled Therapeutic Interventions Met yes, treatment indicated   OT Diagnosis Decreased IND ADLs/IADLs and functional mobility   Influenced by the following impairments Weakness, deconditioning, impaired balance, impaired cognition, fatigue.   Assessment of Occupational Performance 5 or more Performance Deficits   Identified Performance Deficits Performance deficits include transfers, mobility, toileting, dressing, grooming, bathing, household management, meal prep, community transportation, med admin.   Clinical Decision Making (Complexity) Moderate complexity   Therapy Frequency Daily   Predicted Duration of Therapy Intervention (days/wks) 8 days   Anticipated Equipment Needs at Discharge bathing equipment;dressing equipment;toileting equipment   Anticipated Discharge Disposition Home with Home Therapy;Home with Outpatient Therapy   Risks and Benefits of Treatment have been explained. Yes   Patient, Family & other staff in agreement with plan of care Yes   Clinical Impression Comments The pt will benefit from skilled OT intervention to address goals in POC and max functional IND and safety in d/c environment.   Total Evaluation Time   Total Evaluation Time (Minutes) 30

## 2020-09-25 ENCOUNTER — APPOINTMENT (OUTPATIENT)
Dept: SPEECH THERAPY | Facility: CLINIC | Age: 65
End: 2020-09-25
Payer: COMMERCIAL

## 2020-09-25 ENCOUNTER — APPOINTMENT (OUTPATIENT)
Dept: PHYSICAL THERAPY | Facility: CLINIC | Age: 65
End: 2020-09-25
Payer: COMMERCIAL

## 2020-09-25 ENCOUNTER — APPOINTMENT (OUTPATIENT)
Dept: OCCUPATIONAL THERAPY | Facility: CLINIC | Age: 65
End: 2020-09-25
Payer: COMMERCIAL

## 2020-09-25 LAB
GLUCOSE BLDC GLUCOMTR-MCNC: 117 MG/DL (ref 70–99)
GLUCOSE BLDC GLUCOMTR-MCNC: 122 MG/DL (ref 70–99)
GLUCOSE BLDC GLUCOMTR-MCNC: 127 MG/DL (ref 70–99)
GLUCOSE BLDC GLUCOMTR-MCNC: 142 MG/DL (ref 70–99)
GLUCOSE BLDC GLUCOMTR-MCNC: 144 MG/DL (ref 70–99)
GLUCOSE BLDC GLUCOMTR-MCNC: 146 MG/DL (ref 70–99)

## 2020-09-25 PROCEDURE — 97535 SELF CARE MNGMENT TRAINING: CPT | Mod: GO

## 2020-09-25 PROCEDURE — 97130 THER IVNTJ EA ADDL 15 MIN: CPT | Mod: GN | Performed by: SPEECH-LANGUAGE PATHOLOGIST

## 2020-09-25 PROCEDURE — 25000128 H RX IP 250 OP 636: Performed by: PHYSICIAN ASSISTANT

## 2020-09-25 PROCEDURE — 25000132 ZZH RX MED GY IP 250 OP 250 PS 637: Performed by: PHYSICIAN ASSISTANT

## 2020-09-25 PROCEDURE — 97110 THERAPEUTIC EXERCISES: CPT | Mod: GP

## 2020-09-25 PROCEDURE — 00000146 ZZHCL STATISTIC GLUCOSE BY METER IP

## 2020-09-25 PROCEDURE — 97129 THER IVNTJ 1ST 15 MIN: CPT | Mod: GN | Performed by: SPEECH-LANGUAGE PATHOLOGIST

## 2020-09-25 PROCEDURE — 12800006 ZZH R&B REHAB

## 2020-09-25 RX ORDER — ALBUTEROL SULFATE 90 UG/1
2 AEROSOL, METERED RESPIRATORY (INHALATION) 3 TIMES DAILY
Status: DISCONTINUED | OUTPATIENT
Start: 2020-09-25 | End: 2020-09-29

## 2020-09-25 RX ORDER — ALBUTEROL SULFATE 90 UG/1
2 AEROSOL, METERED RESPIRATORY (INHALATION) EVERY 4 HOURS PRN
Status: DISCONTINUED | OUTPATIENT
Start: 2020-09-25 | End: 2020-09-30 | Stop reason: HOSPADM

## 2020-09-25 RX ADMIN — MIRTAZAPINE 15 MG: 7.5 TABLET, FILM COATED ORAL at 21:46

## 2020-09-25 RX ADMIN — ALBUTEROL SULFATE 2 PUFF: 90 AEROSOL, METERED RESPIRATORY (INHALATION) at 12:02

## 2020-09-25 RX ADMIN — METFORMIN HYDROCHLORIDE 500 MG: 500 TABLET ORAL at 17:51

## 2020-09-25 RX ADMIN — ACETAMINOPHEN 650 MG: 325 TABLET, FILM COATED ORAL at 00:45

## 2020-09-25 RX ADMIN — ALBUTEROL SULFATE 2 PUFF: 90 AEROSOL, METERED RESPIRATORY (INHALATION) at 16:26

## 2020-09-25 RX ADMIN — LIDOCAINE 1 PATCH: 560 PATCH PERCUTANEOUS; TOPICAL; TRANSDERMAL at 08:09

## 2020-09-25 RX ADMIN — METFORMIN HYDROCHLORIDE 500 MG: 500 TABLET ORAL at 08:07

## 2020-09-25 RX ADMIN — ALBUTEROL SULFATE 2 PUFF: 90 AEROSOL, METERED RESPIRATORY (INHALATION) at 20:21

## 2020-09-25 RX ADMIN — ENOXAPARIN SODIUM 70 MG: 80 INJECTION SUBCUTANEOUS at 20:16

## 2020-09-25 RX ADMIN — ASPIRIN 81 MG: 81 TABLET, COATED ORAL at 08:05

## 2020-09-25 RX ADMIN — FUROSEMIDE 20 MG: 20 TABLET ORAL at 08:07

## 2020-09-25 RX ADMIN — MELATONIN TAB 3 MG 6 MG: 3 TAB at 21:45

## 2020-09-25 RX ADMIN — ENOXAPARIN SODIUM 70 MG: 80 INJECTION SUBCUTANEOUS at 08:08

## 2020-09-25 RX ADMIN — DULOXETINE HYDROCHLORIDE 40 MG: 20 CAPSULE, DELAYED RELEASE ORAL at 08:06

## 2020-09-25 RX ADMIN — ISAVUCONAZONIUM SULFATE 372 MG: 186 CAPSULE ORAL at 08:07

## 2020-09-25 RX ADMIN — POTASSIUM CHLORIDE 20 MEQ: 750 TABLET, EXTENDED RELEASE ORAL at 08:06

## 2020-09-25 RX ADMIN — HYDROXYZINE HYDROCHLORIDE 25 MG: 25 TABLET, FILM COATED ORAL at 21:45

## 2020-09-25 RX ADMIN — METOPROLOL TARTRATE 25 MG: 25 TABLET, FILM COATED ORAL at 08:21

## 2020-09-25 RX ADMIN — THIAMINE HCL TAB 100 MG 100 MG: 100 TAB at 08:07

## 2020-09-25 RX ADMIN — METOPROLOL TARTRATE 25 MG: 25 TABLET, FILM COATED ORAL at 21:45

## 2020-09-25 RX ADMIN — INSULIN ASPART 1 UNITS: 100 INJECTION, SOLUTION INTRAVENOUS; SUBCUTANEOUS at 17:59

## 2020-09-25 NOTE — PLAN OF CARE
Completed remainder of informal cognitive assessment as tx: Completed ST memory recall - delayed recall able to recall 2/3  words; with paragraph recall- pt at 6/15 accuracy-- having moderate difficulty with recall of details. With problem solving -- able to complete mod level verbal problem solving with 4/4  and 2/6 mod to complex level verbal reasoning and 1/2 numerical reasoning- overall moderate cognitive linguistic deficits

## 2020-09-25 NOTE — PLAN OF CARE
A&O x4. Pt given Tylenol for headache this shift. Pt denied nausea, SOB, or any new concerns overnight. Pt slept majority of shift. Call light within reach and pt able to make needs known.

## 2020-09-25 NOTE — PROGRESS NOTES
09/25/20 0820   Signing Clinician's Name / Credentials   Signing clinician's name / credentials Aileen SHETH/NELDA Kan Adds   Rehab Discipline OT   ADL Training   Minutes of Treatment 53   Treatment ADL training within precautions;Energy conservation/work simplification;Environment structured   Treatment Detail OT: tx focus shower and ADLs. See GG for functional performance details.   Additional Documentation   Rehab Comments OT: -7 min breakfast   OT Plan OT: functional cog, kitchen mobility, (meal prep/med manage/light IADLs), proximal shoulder strengthening in supine, revie HEP w/blue foam and yellow t-putty   Total Session Time   Total Session Time (minutes) 53 minutes  (53adl)   ARC or TCU Only   What unit is patient on? Acute Rehab   OT - Acute Rehab Center Time   Individual Time (minutes) - enter zero if not applicable - OT 53   Group Time (minutes) - enter zero if not applicable  - OT 0   Concurrent Time (minutes) - enter zero if not applicable  - OT 0   Co-Treatment Time (minutes) - enter zero if not applicable  - OT 0   ARC Total Session Time (minutes) - OT 53   Grooming (except oral cares)   Grooming Task Performed Washing hands;Washing face   Grooming Comment OT: SBA seated   Upper Body Dressing   Describe performance OT: Min A seated after shower   Lower Body Dressing (Pants/Undergarments)   Describe performance OT: Min A underwear and pants   Lower Body Dressing putting on/taking off footwear   Describe performance OT: SBA doff/don socks seated   Shower/Bathe self   Describe performance OT: Min A to wash posterior nicki in standing, CGA for other standing wash/rinse tasks with grab bars, SBA washing seated   Tub / Shower Transfer   Tub/Shower Transfer Comment OT: CGA walk in shower with grab bars   Toilet Hygiene   Describe performance OT: SBA seated    Toilet Transfer   Describe performance OT: CGA w/railing   Chair/bed-to-chair Transfer   Describe performance OT: CGA

## 2020-09-25 NOTE — PROGRESS NOTES
09/25/20 1425   Signing Clinician's Name / Credentials   Signing clinician's name / credentials Adela Elizondo MS/CCC-SLP   Quick Adds   Rehab Discipline SLP   Additional Documentation   SLP Plan SLP: continue with goals for reading comprehension, attn, memory and reasoning- more basic to mod level-- ? of what is pt's baseline ?   Total Session Time   Total Session Time (minutes) 45 minutes   ARC or TCU Only   What unit is patient on? Acute Rehab   SLP - Acute Rehab Center Time   Individual Time (minutes) - enter zero if not applicable - SLP 45  (45 cogntive skills)   Group Time (minutes) - enter zero if not applicable  - SLP 0   Concurrent Time (minutes) - enter zero if not applicable  - SLP 0   Co-Treatment Time (minutes) - enter zero if not applicable  - SLP 0   ARC Total Session Time (minutes) - SLP 45   Problem Solving   Functional Performance Maximal direction-needs help to solve routine problems 51-75% of the time   Problem Solving Comment SLP: completed verbal analogies on WJ-R-- pt scored a raw score of 6 and percentile rank of 7th-- this is a very low score on this test   Memory   Functional Performance Maximal prompting-recognizes and remembers 25-49% of the time   Memory Comment SLP: completed visual auditory learning on the WJ-R -- pt scored a raw score of 26- but reahced the celiing cut off early in testing- percentile rank is 15th which is also a very low score on this test

## 2020-09-25 NOTE — PROGRESS NOTES
Per discussion with pt dtr yesterday, SW called Ainsley Ramon Ph: [phone] with [facility] in [facility].     Confirmed that they are indeed open. They are only serving 8 members/day which makes it complicated due to members that are already signed up with services. Each member is only allowed to be there 4 hours/day (compared to normal 6 hours/day). Most members are approved and covered financially by a waiver program but there is an option for private pay which is $20.00/hour.     Called pt son Cali (designated as the first contact for the family). Cali heading into a meeting and asked if SW can call back at a later time. SWer planning to call back on Monday to share the information learned and discuss further. Will offer to contact [facility] to initiate a CADI waiver if family requests. Cali aware and agreeable to the plan.    Called insurance ELOISA Vidal PH: [phone] and left a VM. Will follow up next week as well.     Renetta Holland, JENNIFER, CAD-[place]  [facility]   Phone: [phone]  I   Pager: [phone]

## 2020-09-25 NOTE — PROGRESS NOTES
"  [facility]   Acute Rehabilitation Unit  Daily progress note    INTERVAL HISTORY  Caroline Rashid was seen sitting up in chair, reports some sob and cough overnight, no fevers, no current issues with cough or breathing, is wearing oxygen by nasal cannula, reports she would use inhaler if at home, will schedule and monitor respiratory status. Reports therapy going well. Denies n/v/d, headaches, dizziness, pain, and concerns with bowel or bladder. She acknowledges memory is impaired unable to recognize if this existed prior to hospitalization.       MEDICATIONS  Scheduled meds    albuterol  2 puff Inhalation TID     aspirin  81 mg Oral Daily     DULoxetine  40 mg Oral Daily     enoxaparin ANTICOAGULANT  70 mg Subcutaneous Q12H     furosemide  20 mg Oral Daily     insulin aspart  1-7 Units Subcutaneous TID AC     insulin aspart  1-5 Units Subcutaneous At Bedtime     insulin glargine  15 Units Subcutaneous At Bedtime     isavuconazonium Sulfate  372 mg Oral Daily     lidocaine  1-2 patch Transdermal Q24H     lidocaine   Transdermal Q8H     melatonin  6 mg Oral At Bedtime     metFORMIN  500 mg Oral BID w/meals     metoprolol tartrate  25 mg Oral BID     mirtazapine  15 mg Oral At Bedtime     potassium chloride  20 mEq Oral Daily     vitamin B1  100 mg Oral Daily       PRN meds:  acetaminophen, albuterol, glucose **OR** dextrose **OR** glucagon, hydrOXYzine, - MEDICATION INSTRUCTIONS -, polyethylene glycol, traZODone      PHYSICAL EXAM  /81 (BP Location: Right arm)   Pulse 94   Temp 97.5  F (36.4  C) (Oral)   Resp 16   Ht 1.6 m (5' 3\")   Wt 90.3 kg (199 lb 1.6 oz)   SpO2 98%   BMI 35.27 kg/m    Gen: NAD, sitting in chair   HEENT: NCAT, EOMI nasal cannula in place  Cardio:  rrr  Pulm: nonlabored, Clear on 1 liter nasal cannula  Abd: soft, nontender  Ext: no calf tendernss, no edema  Neuro/MSK: alert speech clear     LABS    Lab Results   Component Value Date    WBC 4.4 " 09/24/2020     Lab Results   Component Value Date    RBC 3.85 09/24/2020     Lab Results   Component Value Date    HGB 11.3 09/24/2020     Lab Results   Component Value Date    HCT 34.2 09/24/2020     Lab Results   Component Value Date    MCV 89 09/24/2020     Lab Results   Component Value Date    MCH 29.4 09/24/2020     Lab Results   Component Value Date    MCHC 33.0 09/24/2020     Lab Results   Component Value Date    RDW 15.8 09/24/2020     Lab Results   Component Value Date     09/24/2020       Last Comprehensive Metabolic Panel:  Sodium   Date Value Ref Range Status   09/24/2020 141 133 - 144 mmol/L Final     Potassium   Date Value Ref Range Status   09/24/2020 4.0 3.4 - 5.3 mmol/L Final     Chloride   Date Value Ref Range Status   09/24/2020 107 94 - 109 mmol/L Final     Carbon Dioxide   Date Value Ref Range Status   09/24/2020 25 20 - 32 mmol/L Final     Anion Gap   Date Value Ref Range Status   09/24/2020 9 3 - 14 mmol/L Final     Glucose   Date Value Ref Range Status   09/24/2020 125 (H) 70 - 99 mg/dL Final     Urea Nitrogen   Date Value Ref Range Status   09/24/2020 15 7 - 30 mg/dL Final     Creatinine   Date Value Ref Range Status   09/24/2020 0.76 0.52 - 1.04 mg/dL Final     GFR Estimate   Date Value Ref Range Status   09/24/2020 82 >60 mL/min/[1.73_m2] Final     Comment:     Non  GFR Calc  Starting 12/18/2018, serum creatinine based estimated GFR (eGFR) will be   calculated using the Chronic Kidney Disease Epidemiology Collaboration   (CKD-EPI) equation.       Calcium   Date Value Ref Range Status   09/24/2020 9.4 8.5 - 10.1 mg/dL Final         ASSESSMENT AND PLAN    Caroline Rashid is a 65 year old right hand dominant female who was hospitalized 8/1-8/31 for COVID 19, then [facility] for vent weaning and medical management 8/31-9/23. Course complicated by hypoxic respiratory failure,aspergillus PNA, NSTEMI, CHICA, encephalopathy. Admitted to acute rehab 9/23 for ongoing rehabilitation  and medical management.     Past medical history significant for anxiety, DM type 2, htn, and HLD.         Admission to acute inpatient rehab 9/23 with debilitiy  Impairment group code: 16        1. PT, OT and SLP 60 minutes of each on a daily basis, in addition to rehab nursing and close management of physiatrist.       2. Impairment of ADL's: Noted to have impaired strength, cognition, and activity tolerance with goal for mod I to I with adls.      3. Impairment of mobility:  Noted to have impaired strength, cognition, and activity tolerance with goal for mod I to I with mobility     4. Impairment of cognition/language/swallow:  Scored 10/30 on MoCA with noted impaired memory will benefit from formal cognitive linguistic evaluation.       5. Medical Conditions  Prolonged acute respiratory failure-2/2 aspergillus and COVID.   - s/p trach 8/26 S/p decannulation 9/15  -oxygen to keep sats >90%  -wean as tolerated  -start albuterol inhaler tid and prn  -encourage IS     COVID pneumonia  - Received remdesivir, Dexamethasone  -DVT prophylaxis as below      Aspergillus pneumonia.  CT Chest 9/21 per report with persistent fungal PNA, Case has been D/w Dr Ferrera ID from [facility] until 10/8/20  -  f/u ID clinic.     Metabolic encephalopathy  History of panic disorder, anxiety  - Prior to hospitalization on gabapentin qhs, hydroxyzine prn anxiety. Duloxetine 40 mg daily, trazodone at bedtime prn sleep, remeron 15 mg qhs. Medications titrated given encephalopathy, anxiety, and NPO status during hospitalization.   - continue cymbalta (increase to home dose 40 mg daily),   -continue remeron, melatonin, prn trazodone  -discontinue tenex   -psychology consult for emotional support     Right hand tremor- family reports as intermittent, new since hospital stay unclear etiology 9/22  could be anxiety related, per chart review has been on propanolol in past though no clear documentation or reported history of  essential tremor.      Right shoulder limited range of motion/right upper extremity weakness- active range of motion with abduction and forward flexion at the right shoulder is limited up to 90 degrees, passive range of motion is normal.  She does not have any pain associated with this, and no sensory loss.  Less likely neurogenic and possibly due to some mechanical injury/rotator cuff tendinopathy.    -continue therapies  -consider referral for outpatient follow up     Chronic rt parietal ischemia- noted on CT head 9/22  -continue asa  -secondary stroke prevention      NSTEMI: during initial hospitalization while acutely ill    Echo 8/14 with normal LVEF 60%.   - ASA  - Metoprolol BID  - Lasix  Daily       Diabetes type 2- prior to admission on metformin 1000 mg bid. Did not check bg prior to admission. A1C 9.6% 8/3/30.  - Lantus 20 units at bedtime and ssi     HTN- prior to admission on lisinopril 40 mg daily, propanolol 40 mg q am,   continue lasix 20 mg daily, metoprolol 25 mg bid  -monitor bp        6. Adjustment to disability:  Clinical psychology to eval and treat  7. FEN: reg  8. Bowel: continent  9. Bladder: continent  10. DVT Prophylaxis: Lovenox at 70mg BID (reduced 9/24 due to heparin 10A level) (therapeutic lovenox started on 9/4, written by pulmonologist) for 29 days till 9/30/20   11. GI Prophylaxis: reg diet  12. Code: full  13. Disposition: home  14. ELOS: 7-8 days.  15. Rehab prognosis:  good  16. Follow up Appointments on Discharge: pcp, infectious disease       Danii Caputo PA-C  PM&R

## 2020-09-25 NOTE — PLAN OF CARE
FOCUS/GOAL  Medical management    ASSESSMENT, INTERVENTIONS AND CONTINUING PLAN FOR GOAL:  Was somnolent at the beginning of the shift  and alert after her shower. Ambulated to the bathroom twice this shift with  CGA using walker. IS and albuterol inhaler started today. Diabetic education and the use of inhaler provided as well. Continue with POC.

## 2020-09-25 NOTE — PLAN OF CARE
Alert and oriented x4. Continent of bowel and bladder in toilet. Last BM 9/23. Transfers with assist of 1 with walker. QID blood sugars. Regular thin diet. On 2L oxygen with a nasal cannula. Bruising on arms and abdomen noted. Bed alarm on for safety, call light within reach, Ok to continue with care plan

## 2020-09-26 ENCOUNTER — APPOINTMENT (OUTPATIENT)
Dept: PHYSICAL THERAPY | Facility: CLINIC | Age: 65
End: 2020-09-26
Payer: COMMERCIAL

## 2020-09-26 ENCOUNTER — APPOINTMENT (OUTPATIENT)
Dept: SPEECH THERAPY | Facility: CLINIC | Age: 65
End: 2020-09-26
Payer: COMMERCIAL

## 2020-09-26 ENCOUNTER — APPOINTMENT (OUTPATIENT)
Dept: OCCUPATIONAL THERAPY | Facility: CLINIC | Age: 65
End: 2020-09-26
Payer: COMMERCIAL

## 2020-09-26 LAB
GLUCOSE BLDC GLUCOMTR-MCNC: 130 MG/DL (ref 70–99)
GLUCOSE BLDC GLUCOMTR-MCNC: 131 MG/DL (ref 70–99)
GLUCOSE BLDC GLUCOMTR-MCNC: 132 MG/DL (ref 70–99)
GLUCOSE BLDC GLUCOMTR-MCNC: 142 MG/DL (ref 70–99)
GLUCOSE BLDC GLUCOMTR-MCNC: 143 MG/DL (ref 70–99)

## 2020-09-26 PROCEDURE — 97110 THERAPEUTIC EXERCISES: CPT | Mod: GP

## 2020-09-26 PROCEDURE — 25000132 ZZH RX MED GY IP 250 OP 250 PS 637: Performed by: PHYSICIAN ASSISTANT

## 2020-09-26 PROCEDURE — 97110 THERAPEUTIC EXERCISES: CPT | Mod: GO

## 2020-09-26 PROCEDURE — 25000128 H RX IP 250 OP 636: Performed by: PHYSICIAN ASSISTANT

## 2020-09-26 PROCEDURE — 12800006 ZZH R&B REHAB

## 2020-09-26 PROCEDURE — 92507 TX SP LANG VOICE COMM INDIV: CPT | Mod: GN

## 2020-09-26 PROCEDURE — 97530 THERAPEUTIC ACTIVITIES: CPT | Mod: GO

## 2020-09-26 PROCEDURE — 00000146 ZZHCL STATISTIC GLUCOSE BY METER IP

## 2020-09-26 RX ADMIN — METOPROLOL TARTRATE 25 MG: 25 TABLET, FILM COATED ORAL at 08:38

## 2020-09-26 RX ADMIN — ENOXAPARIN SODIUM 70 MG: 80 INJECTION SUBCUTANEOUS at 20:11

## 2020-09-26 RX ADMIN — ALBUTEROL SULFATE 2 PUFF: 90 AEROSOL, METERED RESPIRATORY (INHALATION) at 20:11

## 2020-09-26 RX ADMIN — ENOXAPARIN SODIUM 70 MG: 80 INJECTION SUBCUTANEOUS at 08:39

## 2020-09-26 RX ADMIN — LIDOCAINE 1 PATCH: 560 PATCH PERCUTANEOUS; TOPICAL; TRANSDERMAL at 08:54

## 2020-09-26 RX ADMIN — ALBUTEROL SULFATE 2 PUFF: 90 AEROSOL, METERED RESPIRATORY (INHALATION) at 13:14

## 2020-09-26 RX ADMIN — METOPROLOL TARTRATE 25 MG: 25 TABLET, FILM COATED ORAL at 20:11

## 2020-09-26 RX ADMIN — DULOXETINE HYDROCHLORIDE 40 MG: 20 CAPSULE, DELAYED RELEASE ORAL at 08:37

## 2020-09-26 RX ADMIN — POTASSIUM CHLORIDE 20 MEQ: 750 TABLET, EXTENDED RELEASE ORAL at 08:38

## 2020-09-26 RX ADMIN — THIAMINE HCL TAB 100 MG 100 MG: 100 TAB at 08:37

## 2020-09-26 RX ADMIN — FUROSEMIDE 20 MG: 20 TABLET ORAL at 08:38

## 2020-09-26 RX ADMIN — MELATONIN TAB 3 MG 6 MG: 3 TAB at 21:05

## 2020-09-26 RX ADMIN — MIRTAZAPINE 15 MG: 7.5 TABLET, FILM COATED ORAL at 21:05

## 2020-09-26 RX ADMIN — ASPIRIN 81 MG: 81 TABLET, COATED ORAL at 08:36

## 2020-09-26 RX ADMIN — ALBUTEROL SULFATE 2 PUFF: 90 AEROSOL, METERED RESPIRATORY (INHALATION) at 08:37

## 2020-09-26 RX ADMIN — METFORMIN HYDROCHLORIDE 500 MG: 500 TABLET ORAL at 18:20

## 2020-09-26 RX ADMIN — ISAVUCONAZONIUM SULFATE 372 MG: 186 CAPSULE ORAL at 08:37

## 2020-09-26 RX ADMIN — METFORMIN HYDROCHLORIDE 500 MG: 500 TABLET ORAL at 08:37

## 2020-09-26 NOTE — PROGRESS NOTES
Individualized Overall Plan Of Care (IOPOC)      Rehab diagnosis/Impairment Group Code: 16 debility in setting of covid 19, respiratory failure, pneumonia, nstemi  Debility     Expected functional outcome: to achieve a level of mod I     Clinical Impression Comments: PT evaluation complete,treatment initiated.   Mobility:    ADL: The pt will benefit from skilled OT intervention to address goals in POC and max functional IND and safety in d/c environment.    Communication/Cognition/Swallow:   Patient presenting with moderate cognitive linguistic impairment.  Most significant impairments in short term memory, attention, reading comprehension, and executive functioning as demonstrated by difficulty recalling basic information from a short paragraph, flexibility in numerical tasks, and following multi-step commands. Do not suspect that current cognitive function will be a significant barrier to discharge though patient may benefit from additional oversight with higher level IADL tasks. Will continue to get more insight into her impairments through further cognitive-linguistic assessment. Patient motivated to continue improving and working on any areas being recommended.  Current level of function is likely below her baseline level and patient would benefit from skilled intervention to continue maximizing function and facilitate successful return to home.    Intensity of therapy:   PT 60 minutes 7 days per week 12-14 days  OT 60 minutes 7 days per week 12 -14 days  SLP 60 minutes 7 days per week 12 -14  days    Orthotics as needed   Education diabetes  Neuropsychology Testing: Yes        Medical Prognosis: good      Physician summary statement: patient debilitated, but goal will be to achieve a level of mod I     Discharge destination: prior home  Discharge rehabilitation needs: home care, PT, OT and SLP      Estimated length of stay: 12 -14 days      Rehabilitation Physician Avel Medrano DO

## 2020-09-26 NOTE — PLAN OF CARE
FOCUS/GOAL  Medical management    ASSESSMENT, INTERVENTIONS AND CONTINUING PLAN FOR GOAL:  Patient had a good shift. Denied pain, respiratory function within her baseline. BG have been good this shift. No need to correct Education on the use of inhaler enhanced. Continue with POC.

## 2020-09-26 NOTE — PLAN OF CARE
-15 min d/t fatigue; Needed max vc for alertness this session. pt agreeable to therapy at bedside. Has difficulty identifying error and correcting problem with simple bill pay activity. Overall patient tolerates this session well.      Tyrone Shukla, OTR/L

## 2020-09-26 NOTE — PLAN OF CARE
FOCUS/GOAL  Medication management and Medical management    ASSESSMENT, INTERVENTIONS AND CONTINUING PLAN FOR GOAL:  A&O, CGA w/ walker. Makes needs known, alarms on.  Pt denied pain.  Continent of B/B, LBM 9/25.  at 0200AM.  No further concerns at this time, continue with POC.

## 2020-09-26 NOTE — PLAN OF CARE
Significant difficulties noted with some language tasks particularly when patient required to understand words with multiple definitions.  This led to multiple difficulties being able to understand some more basic jokes and figurative language.  Patient able to stay engaged with task throughout sessions but limited insight into the difficulties being experienced.

## 2020-09-26 NOTE — PLAN OF CARE
FOCUS/GOAL  Medical management, Cognition/Memory/Judgment/Problem solving, Psychosocial needs, and Safety management    ASSESSMENT, INTERVENTIONS AND CONTINUING PLAN FOR GOAL:  Pt teary and upset around 2000 after getting into bed from sitting up in chair. Writer offered support and pt given PRN atarax. Pt verbalized relief.     Orientation: A&O X 4  Bowel/Bladder: Continent; uses toilet in room   LBM 9/25/20  Pain: tolerable pain from injections on right hip/leg. Given ice  Ambulation/Transfers: CGA with walker  Blood Sugars:  & 142. Given 1 unit Novolog at dinner time.  Diet/liquids: Reg/thin  Oxygen: room air  Skin: warm, dry, intact, bruised  Isolation: standard precautions maintained  Vitals: Temp: 96.2  F (35.7  C) Temp src: Oral BP: (!) 143/77 Pulse: 96   Resp: 16 SpO2: 100 % O2 Device: None (Room air)      Education done: Edema prevention    Pt resting comfortably; safety maintained; call light within reach. Will continue POC.

## 2020-09-26 NOTE — PROGRESS NOTES
[facility]   Acute Rehabilitation Unit  Daily progress note    INTERVAL HISTORY  Caroline Rashid was seen resting in bed.  No acute events overnight.  Denies chest pain, shortness of breath, no fever or chills.  Elevated BP in 150s which alarmed patient, reassured her this afternoon.  She states she was propanolol in past, will considered changed lopressor given some underlying anxiety.      Functional:  SLP:  Completed remainder of informal cognitive assessment as tx: Completed ST memory recall - delayed recall able to recall 2/3  words; with paragraph recall- pt at 6/15 accuracy-- having moderate difficulty with recall of details. With problem solving -- able to complete mod level verbal problem solving with 4/4  and 2/6 mod to complex level verbal reasoning and 1/2 numerical reasoning- overall moderate cognitive linguistic deficits    ROS: 10 point ROS neg other than the symptoms noted above in the HPI.      MEDICATIONS  Scheduled meds    albuterol  2 puff Inhalation TID     aspirin  81 mg Oral Daily     DULoxetine  40 mg Oral Daily     enoxaparin ANTICOAGULANT  70 mg Subcutaneous Q12H     furosemide  20 mg Oral Daily     insulin aspart  1-7 Units Subcutaneous TID AC     insulin aspart  1-5 Units Subcutaneous At Bedtime     insulin glargine  15 Units Subcutaneous At Bedtime     isavuconazonium Sulfate  372 mg Oral Daily     lidocaine  1-2 patch Transdermal Q24H     lidocaine   Transdermal Q8H     melatonin  6 mg Oral At Bedtime     metFORMIN  500 mg Oral BID w/meals     metoprolol tartrate  25 mg Oral BID     mirtazapine  15 mg Oral At Bedtime     potassium chloride  20 mEq Oral Daily     vitamin B1  100 mg Oral Daily       PRN meds:  acetaminophen, albuterol, glucose **OR** dextrose **OR** glucagon, hydrOXYzine, - MEDICATION INSTRUCTIONS -, polyethylene glycol, traZODone      PHYSICAL EXAM  /77 (BP Location: Left arm)   Pulse 91   Temp 97.9  F (36.6  C) (Oral)   Resp  "16   Ht 1.6 m (5' 3\")   Wt 90.3 kg (199 lb 1.6 oz)   SpO2 100%   BMI 35.27 kg/m    Gen: NAD, resting in bed  HEENT: NCAT, EOMI   Cardio:  Rrr, +s1+S2  Pulm: nonlabored, Clear, no wheezes  Abd: soft, nontender  Ext: no calf tendernss, no edema  Neuro/MSK: alert speech clear, full AROM and PROM, some limitation on RUE, shoulder area.      LABS    Lab Results   Component Value Date    WBC 4.4 09/24/2020     Lab Results   Component Value Date    RBC 3.85 09/24/2020     Lab Results   Component Value Date    HGB 11.3 09/24/2020     Lab Results   Component Value Date    HCT 34.2 09/24/2020     Lab Results   Component Value Date    MCV 89 09/24/2020     Lab Results   Component Value Date    MCH 29.4 09/24/2020     Lab Results   Component Value Date    MCHC 33.0 09/24/2020     Lab Results   Component Value Date    RDW 15.8 09/24/2020     Lab Results   Component Value Date     09/24/2020       Last Comprehensive Metabolic Panel:  Sodium   Date Value Ref Range Status   09/24/2020 141 133 - 144 mmol/L Final     Potassium   Date Value Ref Range Status   09/24/2020 4.0 3.4 - 5.3 mmol/L Final     Chloride   Date Value Ref Range Status   09/24/2020 107 94 - 109 mmol/L Final     Carbon Dioxide   Date Value Ref Range Status   09/24/2020 25 20 - 32 mmol/L Final     Anion Gap   Date Value Ref Range Status   09/24/2020 9 3 - 14 mmol/L Final     Glucose   Date Value Ref Range Status   09/24/2020 125 (H) 70 - 99 mg/dL Final     Urea Nitrogen   Date Value Ref Range Status   09/24/2020 15 7 - 30 mg/dL Final     Creatinine   Date Value Ref Range Status   09/24/2020 0.76 0.52 - 1.04 mg/dL Final     GFR Estimate   Date Value Ref Range Status   09/24/2020 82 >60 mL/min/[1.73_m2] Final     Comment:     Non  GFR Calc  Starting 12/18/2018, serum creatinine based estimated GFR (eGFR) will be   calculated using the Chronic Kidney Disease Epidemiology Collaboration   (CKD-EPI) equation.       Calcium   Date Value Ref Range " Status   09/24/2020 9.4 8.5 - 10.1 mg/dL Final         ASSESSMENT AND PLAN    Caroline Rashid is a 65 year old right hand dominant female who was hospitalized 8/1-8/31 for COVID 19, then [facility] for vent weaning and medical management 8/31-9/23. Course complicated by hypoxic respiratory failure,aspergillus PNA, NSTEMI, CHICA, encephalopathy. Admitted to acute rehab 9/23 for ongoing rehabilitation and medical management.     Past medical history significant for anxiety, DM type 2, htn, and HLD.         Admission to acute inpatient rehab 9/23 with debilitiy  Impairment group code: 16        1. PT, OT and SLP 60 minutes of each on a daily basis, in addition to rehab nursing and close management of physiatrist.       2. Impairment of ADL's: Noted to have impaired strength, cognition, and activity tolerance with goal for mod I to I with adls.      3. Impairment of mobility:  Noted to have impaired strength, cognition, and activity tolerance with goal for mod I to I with mobility     4. Impairment of cognition/language/swallow:  Scored 10/30 on MoCA with noted impaired memory will benefit from formal cognitive linguistic evaluation.       5. Medical Conditions  Prolonged acute respiratory failure-2/2 aspergillus and COVID.   - s/p trach 8/26 S/p decannulation 9/15  -oxygen to keep sats >90%  -wean as tolerated  -start albuterol inhaler tid and prn  -encourage IS     COVID pneumonia  - Received remdesivir, Dexamethasone  -DVT prophylaxis as below      Aspergillus pneumonia.  CT Chest 9/21 per report with persistent fungal PNA, Case has been D/w Dr Ferrera ID from [facility] until 10/8/20  -  f/u ID clinic.     Metabolic encephalopathy  History of panic disorder, anxiety  - Prior to hospitalization on gabapentin qhs, hydroxyzine prn anxiety. Duloxetine 40 mg daily, trazodone at bedtime prn sleep, remeron 15 mg qhs. Medications titrated given encephalopathy, anxiety, and NPO status during hospitalization.   -  continue cymbalta (increase to home dose 40 mg daily),   -continue remeron, melatonin, prn trazodone  -discontinue tenex   -psychology consult for emotional support     Right hand tremor- family reports as intermittent, new since hospital stay unclear etiology 9/22  could be anxiety related, per chart review has been on propanolol in past though no clear documentation or reported history of essential tremor.      Right shoulder limited range of motion/right upper extremity weakness- active range of motion with abduction and forward flexion at the right shoulder is limited up to 90 degrees, passive range of motion is normal.  She does not have any pain associated with this, and no sensory loss.  Less likely neurogenic and possibly due to some mechanical injury/rotator cuff tendinopathy.    -continue therapies  -consider referral for outpatient follow up     Chronic rt parietal ischemia- noted on CT head 9/22  -continue asa  -secondary stroke prevention      NSTEMI: during initial hospitalization while acutely ill    Echo 8/14 with normal LVEF 60%.   - ASA  - Metoprolol BID  - Lasix  Daily       Diabetes type 2- prior to admission on metformin 1000 mg bid. Did not check bg prior to admission. A1C 9.6% 8/3/30.  - Lantus 20 units at bedtime and ssi     HTN- prior to admission on lisinopril 40 mg daily, propanolol 40 mg q am,   continue lasix 20 mg daily, metoprolol 25 mg bid  -monitor bp, there was elevation in 150s yesterday which alarmed patient, reassured this afternoon and will continue to monitor.         6. Adjustment to disability:  Clinical psychology to eval and treat  7. FEN: reg  8. Bowel: continent  9. Bladder: continent  10. DVT Prophylaxis: Lovenox at 70mg BID (reduced 9/24 due to heparin 10A level) (therapeutic lovenox started on 9/4, written by pulmonologist) for 29 days till 9/30/20   11. GI Prophylaxis: reg diet  12. Code: full  13. Disposition: home  14. ELOS: 7-8 days.  15. Rehab prognosis:   good  16. Follow up Appointments on Discharge: pcp, infectious disease       Avel Medrano, DO        I spent a total of 25 minutes face to face and coordinating care of Caroline Rashid. Over 50% of my time on the unit was spent counseling the patient and /or coordinating care regarding debility post Covid.

## 2020-09-27 ENCOUNTER — APPOINTMENT (OUTPATIENT)
Dept: SPEECH THERAPY | Facility: CLINIC | Age: 65
End: 2020-09-27
Payer: COMMERCIAL

## 2020-09-27 ENCOUNTER — APPOINTMENT (OUTPATIENT)
Dept: PHYSICAL THERAPY | Facility: CLINIC | Age: 65
End: 2020-09-27
Payer: COMMERCIAL

## 2020-09-27 ENCOUNTER — APPOINTMENT (OUTPATIENT)
Dept: OCCUPATIONAL THERAPY | Facility: CLINIC | Age: 65
End: 2020-09-27
Payer: COMMERCIAL

## 2020-09-27 LAB
GLUCOSE BLDC GLUCOMTR-MCNC: 104 MG/DL (ref 70–99)
GLUCOSE BLDC GLUCOMTR-MCNC: 121 MG/DL (ref 70–99)
GLUCOSE BLDC GLUCOMTR-MCNC: 132 MG/DL (ref 70–99)
GLUCOSE BLDC GLUCOMTR-MCNC: 137 MG/DL (ref 70–99)
GLUCOSE BLDC GLUCOMTR-MCNC: 139 MG/DL (ref 70–99)
GLUCOSE BLDC GLUCOMTR-MCNC: 154 MG/DL (ref 70–99)

## 2020-09-27 PROCEDURE — 25000128 H RX IP 250 OP 636: Performed by: PHYSICIAN ASSISTANT

## 2020-09-27 PROCEDURE — 12800006 ZZH R&B REHAB

## 2020-09-27 PROCEDURE — 97129 THER IVNTJ 1ST 15 MIN: CPT | Mod: GN

## 2020-09-27 PROCEDURE — 97110 THERAPEUTIC EXERCISES: CPT | Mod: GP

## 2020-09-27 PROCEDURE — 97130 THER IVNTJ EA ADDL 15 MIN: CPT | Mod: GN

## 2020-09-27 PROCEDURE — 97535 SELF CARE MNGMENT TRAINING: CPT | Mod: GO

## 2020-09-27 PROCEDURE — 00000146 ZZHCL STATISTIC GLUCOSE BY METER IP

## 2020-09-27 PROCEDURE — 92507 TX SP LANG VOICE COMM INDIV: CPT | Mod: GN

## 2020-09-27 PROCEDURE — 25000132 ZZH RX MED GY IP 250 OP 250 PS 637: Performed by: PHYSICIAN ASSISTANT

## 2020-09-27 PROCEDURE — 25000132 ZZH RX MED GY IP 250 OP 250 PS 637: Performed by: PHYSICAL MEDICINE & REHABILITATION

## 2020-09-27 PROCEDURE — 97110 THERAPEUTIC EXERCISES: CPT | Mod: GO

## 2020-09-27 RX ORDER — CALCIUM CARBONATE 500 MG/1
500 TABLET, CHEWABLE ORAL DAILY PRN
Status: DISCONTINUED | OUTPATIENT
Start: 2020-09-27 | End: 2020-09-30 | Stop reason: HOSPADM

## 2020-09-27 RX ADMIN — POTASSIUM CHLORIDE 20 MEQ: 750 TABLET, EXTENDED RELEASE ORAL at 08:19

## 2020-09-27 RX ADMIN — FUROSEMIDE 20 MG: 20 TABLET ORAL at 08:18

## 2020-09-27 RX ADMIN — METOPROLOL TARTRATE 25 MG: 25 TABLET, FILM COATED ORAL at 08:18

## 2020-09-27 RX ADMIN — ALBUTEROL SULFATE 2 PUFF: 90 AEROSOL, METERED RESPIRATORY (INHALATION) at 08:18

## 2020-09-27 RX ADMIN — ALBUTEROL SULFATE 2 PUFF: 90 AEROSOL, METERED RESPIRATORY (INHALATION) at 13:29

## 2020-09-27 RX ADMIN — ENOXAPARIN SODIUM 70 MG: 80 INJECTION SUBCUTANEOUS at 20:34

## 2020-09-27 RX ADMIN — ISAVUCONAZONIUM SULFATE 372 MG: 186 CAPSULE ORAL at 08:32

## 2020-09-27 RX ADMIN — LIDOCAINE 1 PATCH: 560 PATCH PERCUTANEOUS; TOPICAL; TRANSDERMAL at 08:19

## 2020-09-27 RX ADMIN — ASPIRIN 81 MG: 81 TABLET, COATED ORAL at 08:18

## 2020-09-27 RX ADMIN — CALCIUM CARBONATE (ANTACID) CHEW TAB 500 MG 500 MG: 500 CHEW TAB at 13:29

## 2020-09-27 RX ADMIN — METFORMIN HYDROCHLORIDE 500 MG: 500 TABLET ORAL at 08:19

## 2020-09-27 RX ADMIN — THIAMINE HCL TAB 100 MG 100 MG: 100 TAB at 08:19

## 2020-09-27 RX ADMIN — METFORMIN HYDROCHLORIDE 500 MG: 500 TABLET ORAL at 18:26

## 2020-09-27 RX ADMIN — MELATONIN TAB 3 MG 6 MG: 3 TAB at 21:29

## 2020-09-27 RX ADMIN — MIRTAZAPINE 15 MG: 7.5 TABLET, FILM COATED ORAL at 21:29

## 2020-09-27 RX ADMIN — ALBUTEROL SULFATE 2 PUFF: 90 AEROSOL, METERED RESPIRATORY (INHALATION) at 20:35

## 2020-09-27 RX ADMIN — ACETAMINOPHEN 650 MG: 325 TABLET, FILM COATED ORAL at 05:14

## 2020-09-27 RX ADMIN — ENOXAPARIN SODIUM 70 MG: 80 INJECTION SUBCUTANEOUS at 08:20

## 2020-09-27 RX ADMIN — METOPROLOL TARTRATE 25 MG: 25 TABLET, FILM COATED ORAL at 20:34

## 2020-09-27 RX ADMIN — DULOXETINE HYDROCHLORIDE 40 MG: 20 CAPSULE, DELAYED RELEASE ORAL at 08:18

## 2020-09-27 NOTE — PLAN OF CARE
FOCUS/GOAL  Medication management and Medical management     ASSESSMENT, INTERVENTIONS AND CONTINUING PLAN FOR GOAL:  A&O, CGA w/ walker. Makes needs known, alarms on.  Pt reported headache, PRN tylenol given.  Continent of B/B, LBM 9/25.  at 0200AM.  No further concerns at this time, continue with POC.

## 2020-09-27 NOTE — PLAN OF CARE
PTA:  Focus on strength:  Gait as exercise 200'x1 without AD close SBA to CGA, 200' 4WW SBA.  Insued and instructed pt in standing HEP including ankle DF/PF, mini squats, hip flexion, hip abd, hip ext and hamstring curls completing 2x10 reps each with seated rests b/t each set.  Pt would benefit from continued reinforcement of exercises with handout.

## 2020-09-27 NOTE — PLAN OF CARE
Multiple definitions worksheet patient better performance today as compared to yesterday but difficulties still evident. Benefitted from be provided with verbal cues to differentiate action vs noun. Reading comprehension, multiple small errors leading to just 50% accuracy.

## 2020-09-27 NOTE — PLAN OF CARE
FOCUS/GOAL  Medical management    ASSESSMENT, INTERVENTIONS AND CONTINUING PLAN FOR GOAL:  Patient complained of upset stomach around noon and asking for medication, no PRN available The abdomen is distended, Bowel sounds are present all 4 quadrants. She reported a loose stool  X1. Dr Ambrosio MAHONEY informed in person. Started on Calcium Carbonate PRN. Received one at 13:30 with positive effect. Cntinue with POC.

## 2020-09-27 NOTE — PLAN OF CARE
FOCUS/GOAL  Medical management and Safety management    ASSESSMENT, INTERVENTIONS AND CONTINUING PLAN FOR GOAL:    Pt is alert and oriented by 4. No complaints of pain. Remains SBA with walker and gait belt. VSS along with BG within parameters. Appetite was good, ate 100% of meal. Continent of bladder. No BM this shift. Continue with plan of care.

## 2020-09-27 NOTE — PROGRESS NOTES
"  [facility]   Acute Rehabilitation Unit  Daily progress note    INTERVAL HISTORY  Caroline Rashid was seen resting in bed.  No acute events overnight.  Denies chest pain, shortness of breath, no fever or chills. BP running better this morning.  Will continue therapy regimen.      ROS: 10 point ROS neg other than the symptoms noted above in the HPI.      MEDICATIONS  Scheduled meds    albuterol  2 puff Inhalation TID     aspirin  81 mg Oral Daily     DULoxetine  40 mg Oral Daily     enoxaparin ANTICOAGULANT  70 mg Subcutaneous Q12H     furosemide  20 mg Oral Daily     insulin aspart  1-7 Units Subcutaneous TID AC     insulin aspart  1-5 Units Subcutaneous At Bedtime     insulin glargine  15 Units Subcutaneous At Bedtime     isavuconazonium Sulfate  372 mg Oral Daily     lidocaine  1-2 patch Transdermal Q24H     lidocaine   Transdermal Q8H     melatonin  6 mg Oral At Bedtime     metFORMIN  500 mg Oral BID w/meals     metoprolol tartrate  25 mg Oral BID     mirtazapine  15 mg Oral At Bedtime     potassium chloride  20 mEq Oral Daily     vitamin B1  100 mg Oral Daily       PRN meds:  acetaminophen, albuterol, glucose **OR** dextrose **OR** glucagon, hydrOXYzine, - MEDICATION INSTRUCTIONS -, polyethylene glycol, traZODone      PHYSICAL EXAM  /70 (BP Location: Left arm)   Pulse 90   Temp 98.1  F (36.7  C) (Oral)   Resp 16   Ht 1.6 m (5' 3\")   Wt 90.3 kg (199 lb 1.6 oz)   SpO2 96%   BMI 35.27 kg/m    Gen: NAD, sitting up at bedside  HEENT: NCAT, EOMI   Cardio:  Rrr, +s1+S2  Pulm: nonlabored, Clear, no wheezes  Abd: soft, nontender  Ext: no calf tendernss, no edema  Neuro/MSK: alert speech clear, full AROM and PROM, some limitation on RUE, shoulder area.      LABS    Lab Results   Component Value Date    WBC 4.4 09/24/2020     Lab Results   Component Value Date    RBC 3.85 09/24/2020     Lab Results   Component Value Date    HGB 11.3 09/24/2020     Lab Results   Component " Value Date    HCT 34.2 09/24/2020     Lab Results   Component Value Date    MCV 89 09/24/2020     Lab Results   Component Value Date    MCH 29.4 09/24/2020     Lab Results   Component Value Date    MCHC 33.0 09/24/2020     Lab Results   Component Value Date    RDW 15.8 09/24/2020     Lab Results   Component Value Date     09/24/2020       Last Comprehensive Metabolic Panel:  Sodium   Date Value Ref Range Status   09/24/2020 141 133 - 144 mmol/L Final     Potassium   Date Value Ref Range Status   09/24/2020 4.0 3.4 - 5.3 mmol/L Final     Chloride   Date Value Ref Range Status   09/24/2020 107 94 - 109 mmol/L Final     Carbon Dioxide   Date Value Ref Range Status   09/24/2020 25 20 - 32 mmol/L Final     Anion Gap   Date Value Ref Range Status   09/24/2020 9 3 - 14 mmol/L Final     Glucose   Date Value Ref Range Status   09/24/2020 125 (H) 70 - 99 mg/dL Final     Urea Nitrogen   Date Value Ref Range Status   09/24/2020 15 7 - 30 mg/dL Final     Creatinine   Date Value Ref Range Status   09/24/2020 0.76 0.52 - 1.04 mg/dL Final     GFR Estimate   Date Value Ref Range Status   09/24/2020 82 >60 mL/min/[1.73_m2] Final     Comment:     Non  GFR Calc  Starting 12/18/2018, serum creatinine based estimated GFR (eGFR) will be   calculated using the Chronic Kidney Disease Epidemiology Collaboration   (CKD-EPI) equation.       Calcium   Date Value Ref Range Status   09/24/2020 9.4 8.5 - 10.1 mg/dL Final         ASSESSMENT AND PLAN    Caroline Rashid is a 65 year old right hand dominant female who was hospitalized 8/1-8/31 for COVID 19, [facility] for vent weaning and medical management 8/31-9/23. Course complicated by hypoxic respiratory failure,aspergillus PNA, NSTEMI, CHICA, encephalopathy. Admitted to acute rehab 9/23 for ongoing rehabilitation and medical management.     Past medical history significant for anxiety, DM type 2, htn, and HLD.         Admission to acute inpatient rehab 9/23 with  debilitiy  Impairment group code: 16        1. PT, OT and SLP 60 minutes of each on a daily basis, in addition to rehab nursing and close management of physiatrist.       2. Impairment of ADL's: Noted to have impaired strength, cognition, and activity tolerance with goal for mod I to I with adls.      3. Impairment of mobility:  Noted to have impaired strength, cognition, and activity tolerance with goal for mod I to I with mobility     4. Impairment of cognition/language/swallow:  Scored 10/30 on MoCA with noted impaired memory will benefit from formal cognitive linguistic evaluation.       5. Medical Conditions  Prolonged acute respiratory failure-2/2 aspergillus and COVID.   - s/p trach 8/26 S/p decannulation 9/15  -oxygen to keep sats >90%  -wean as tolerated  -start albuterol inhaler tid and prn  -encourage IS     COVID pneumonia  - Received remdesivir, Dexamethasone  -DVT prophylaxis as below      Aspergillus pneumonia.  CT Chest 9/21 per report with persistent fungal PNA, Case has been D/w Dr Ferrera ID from [facility] until 10/8/20  -  f/u ID clinic.     Metabolic encephalopathy  History of panic disorder, anxiety  - Prior to hospitalization on gabapentin qhs, hydroxyzine prn anxiety. Duloxetine 40 mg daily, trazodone at bedtime prn sleep, remeron 15 mg qhs. Medications titrated given encephalopathy, anxiety, and NPO status during hospitalization.   - continue cymbalta (increase to home dose 40 mg daily),   -continue remeron, melatonin, prn trazodone  -discontinue tenex   -psychology consult for emotional support     Right hand tremor- family reports as intermittent, new since hospital stay unclear etiology 9/22  could be anxiety related, per chart review has been on propanolol in past though no clear documentation or reported history of essential tremor.      Right shoulder limited range of motion/right upper extremity weakness- active range of motion with abduction and forward flexion at the  right shoulder is limited up to 90 degrees, passive range of motion is normal.  She does not have any pain associated with this, and no sensory loss.  Less likely neurogenic and possibly due to some mechanical injury/rotator cuff tendinopathy.    -continue therapies  -consider referral for outpatient follow up     Chronic rt parietal ischemia- noted on CT head 9/22  -continue asa  -secondary stroke prevention      NSTEMI: during initial hospitalization while acutely ill    Echo 8/14 with normal LVEF 60%.   - ASA  - Metoprolol BID  - Lasix  Daily       Diabetes type 2- prior to admission on metformin 1000 mg bid. Did not check bg prior to admission. A1C 9.6% 8/3/30.  - Lantus 20 units at bedtime and ssi     HTN- prior to admission on lisinopril 40 mg daily, propanolol 40 mg q am,   continue lasix 20 mg daily, metoprolol 25 mg bid  -monitor bp, there was elevation in 150s friday which alarmed patient, reassured and will continue to monitor.         6. Adjustment to disability:  Clinical psychology to eval and treat  7. FEN: reg  8. Bowel: continent  9. Bladder: continent  10. DVT Prophylaxis: Lovenox at 70mg BID (reduced 9/24 due to heparin 10A level) (therapeutic lovenox started on 9/4, written by pulmonologist) for 29 days till 9/30/20   11. GI Prophylaxis: reg diet  12. Code: full  13. Disposition: home  14. ELOS: 7-8 days.  15. Rehab prognosis:  good  16. Follow up Appointments on Discharge: pcp, infectious disease       Avel Medrano,         I spent a total of 25 minutes face to face and coordinating care of Caroline Rashid. Over 50% of my time on the unit was spent counseling the patient and /or coordinating care regarding debility post Covid.

## 2020-09-28 ENCOUNTER — APPOINTMENT (OUTPATIENT)
Dept: OCCUPATIONAL THERAPY | Facility: CLINIC | Age: 65
End: 2020-09-28
Payer: COMMERCIAL

## 2020-09-28 ENCOUNTER — APPOINTMENT (OUTPATIENT)
Dept: PHYSICAL THERAPY | Facility: CLINIC | Age: 65
End: 2020-09-28
Payer: COMMERCIAL

## 2020-09-28 ENCOUNTER — APPOINTMENT (OUTPATIENT)
Dept: SPEECH THERAPY | Facility: CLINIC | Age: 65
End: 2020-09-28
Payer: COMMERCIAL

## 2020-09-28 LAB
ANION GAP SERPL CALCULATED.3IONS-SCNC: 11 MMOL/L (ref 3–14)
BASOPHILS # BLD AUTO: 0 10E9/L (ref 0–0.2)
BASOPHILS NFR BLD AUTO: 0.5 %
BUN SERPL-MCNC: 11 MG/DL (ref 7–30)
CALCIUM SERPL-MCNC: 10 MG/DL (ref 8.5–10.1)
CHLORIDE SERPL-SCNC: 107 MMOL/L (ref 94–109)
CO2 SERPL-SCNC: 19 MMOL/L (ref 20–32)
CREAT SERPL-MCNC: 0.86 MG/DL (ref 0.52–1.04)
DIFFERENTIAL METHOD BLD: ABNORMAL
EOSINOPHIL # BLD AUTO: 0.1 10E9/L (ref 0–0.7)
EOSINOPHIL NFR BLD AUTO: 1.5 %
ERYTHROCYTE [DISTWIDTH] IN BLOOD BY AUTOMATED COUNT: 15.3 % (ref 10–15)
GFR SERPL CREATININE-BSD FRML MDRD: 71 ML/MIN/{1.73_M2}
GLUCOSE BLDC GLUCOMTR-MCNC: 112 MG/DL (ref 70–99)
GLUCOSE BLDC GLUCOMTR-MCNC: 125 MG/DL (ref 70–99)
GLUCOSE BLDC GLUCOMTR-MCNC: 126 MG/DL (ref 70–99)
GLUCOSE BLDC GLUCOMTR-MCNC: 135 MG/DL (ref 70–99)
GLUCOSE BLDC GLUCOMTR-MCNC: 178 MG/DL (ref 70–99)
GLUCOSE SERPL-MCNC: 132 MG/DL (ref 70–99)
HCT VFR BLD AUTO: 38.2 % (ref 35–47)
HGB BLD-MCNC: 12.7 G/DL (ref 11.7–15.7)
IMM GRANULOCYTES # BLD: 0 10E9/L (ref 0–0.4)
IMM GRANULOCYTES NFR BLD: 0.2 %
LYMPHOCYTES # BLD AUTO: 3 10E9/L (ref 0.8–5.3)
LYMPHOCYTES NFR BLD AUTO: 49.9 %
MCH RBC QN AUTO: 29.3 PG (ref 26.5–33)
MCHC RBC AUTO-ENTMCNC: 33.2 G/DL (ref 31.5–36.5)
MCV RBC AUTO: 88 FL (ref 78–100)
MONOCYTES # BLD AUTO: 0.3 10E9/L (ref 0–1.3)
MONOCYTES NFR BLD AUTO: 4.6 %
NEUTROPHILS # BLD AUTO: 2.6 10E9/L (ref 1.6–8.3)
NEUTROPHILS NFR BLD AUTO: 43.3 %
NRBC # BLD AUTO: 0 10*3/UL
NRBC BLD AUTO-RTO: 0 /100
PLATELET # BLD AUTO: 361 10E9/L (ref 150–450)
POTASSIUM SERPL-SCNC: 4.2 MMOL/L (ref 3.4–5.3)
RBC # BLD AUTO: 4.33 10E12/L (ref 3.8–5.2)
SODIUM SERPL-SCNC: 137 MMOL/L (ref 133–144)
WBC # BLD AUTO: 6.1 10E9/L (ref 4–11)

## 2020-09-28 PROCEDURE — 80048 BASIC METABOLIC PNL TOTAL CA: CPT | Performed by: PHYSICAL MEDICINE & REHABILITATION

## 2020-09-28 PROCEDURE — 25000132 ZZH RX MED GY IP 250 OP 250 PS 637: Performed by: PHYSICIAN ASSISTANT

## 2020-09-28 PROCEDURE — 85025 COMPLETE CBC W/AUTO DIFF WBC: CPT | Performed by: PHYSICAL MEDICINE & REHABILITATION

## 2020-09-28 PROCEDURE — 00000146 ZZHCL STATISTIC GLUCOSE BY METER IP

## 2020-09-28 PROCEDURE — 36415 COLL VENOUS BLD VENIPUNCTURE: CPT | Performed by: PHYSICAL MEDICINE & REHABILITATION

## 2020-09-28 PROCEDURE — 92507 TX SP LANG VOICE COMM INDIV: CPT | Mod: GN

## 2020-09-28 PROCEDURE — 97129 THER IVNTJ 1ST 15 MIN: CPT | Mod: GN

## 2020-09-28 PROCEDURE — 97110 THERAPEUTIC EXERCISES: CPT | Mod: GP

## 2020-09-28 PROCEDURE — 25000128 H RX IP 250 OP 636: Performed by: PHYSICIAN ASSISTANT

## 2020-09-28 PROCEDURE — 12800006 ZZH R&B REHAB

## 2020-09-28 PROCEDURE — 97530 THERAPEUTIC ACTIVITIES: CPT | Mod: GO

## 2020-09-28 PROCEDURE — 97130 THER IVNTJ EA ADDL 15 MIN: CPT | Mod: GN

## 2020-09-28 PROCEDURE — 97530 THERAPEUTIC ACTIVITIES: CPT | Mod: GP

## 2020-09-28 RX ADMIN — POTASSIUM CHLORIDE 20 MEQ: 750 TABLET, EXTENDED RELEASE ORAL at 08:55

## 2020-09-28 RX ADMIN — DULOXETINE HYDROCHLORIDE 40 MG: 20 CAPSULE, DELAYED RELEASE ORAL at 08:55

## 2020-09-28 RX ADMIN — HYDROXYZINE HYDROCHLORIDE 25 MG: 25 TABLET, FILM COATED ORAL at 00:04

## 2020-09-28 RX ADMIN — ENOXAPARIN SODIUM 70 MG: 80 INJECTION SUBCUTANEOUS at 19:20

## 2020-09-28 RX ADMIN — FUROSEMIDE 20 MG: 20 TABLET ORAL at 09:27

## 2020-09-28 RX ADMIN — ALBUTEROL SULFATE 2 PUFF: 90 AEROSOL, METERED RESPIRATORY (INHALATION) at 19:24

## 2020-09-28 RX ADMIN — ISAVUCONAZONIUM SULFATE 372 MG: 186 CAPSULE ORAL at 08:56

## 2020-09-28 RX ADMIN — ASPIRIN 81 MG: 81 TABLET, COATED ORAL at 08:55

## 2020-09-28 RX ADMIN — ENOXAPARIN SODIUM 70 MG: 80 INJECTION SUBCUTANEOUS at 08:56

## 2020-09-28 RX ADMIN — INSULIN ASPART 1 UNITS: 100 INJECTION, SOLUTION INTRAVENOUS; SUBCUTANEOUS at 09:25

## 2020-09-28 RX ADMIN — METOPROLOL TARTRATE 25 MG: 25 TABLET, FILM COATED ORAL at 08:55

## 2020-09-28 RX ADMIN — LIDOCAINE 1 PATCH: 560 PATCH PERCUTANEOUS; TOPICAL; TRANSDERMAL at 08:56

## 2020-09-28 RX ADMIN — ALBUTEROL SULFATE 2 PUFF: 90 AEROSOL, METERED RESPIRATORY (INHALATION) at 08:56

## 2020-09-28 RX ADMIN — ALBUTEROL SULFATE 2 PUFF: 90 AEROSOL, METERED RESPIRATORY (INHALATION) at 14:04

## 2020-09-28 RX ADMIN — THIAMINE HCL TAB 100 MG 100 MG: 100 TAB at 08:55

## 2020-09-28 RX ADMIN — METFORMIN HYDROCHLORIDE 1000 MG: 500 TABLET ORAL at 09:00

## 2020-09-28 RX ADMIN — METOPROLOL TARTRATE 25 MG: 25 TABLET, FILM COATED ORAL at 21:13

## 2020-09-28 RX ADMIN — MIRTAZAPINE 15 MG: 7.5 TABLET, FILM COATED ORAL at 22:37

## 2020-09-28 RX ADMIN — ALBUTEROL SULFATE 2 PUFF: 108 INHALANT RESPIRATORY (INHALATION) at 13:18

## 2020-09-28 RX ADMIN — HYDROXYZINE HYDROCHLORIDE 25 MG: 25 TABLET, FILM COATED ORAL at 17:43

## 2020-09-28 RX ADMIN — METFORMIN HYDROCHLORIDE 1000 MG: 500 TABLET ORAL at 17:40

## 2020-09-28 RX ADMIN — MELATONIN TAB 3 MG 6 MG: 3 TAB at 22:37

## 2020-09-28 ASSESSMENT — MIFFLIN-ST. JEOR: SCORE: 1405.91

## 2020-09-28 NOTE — PLAN OF CARE
FOCUS/GOAL  Medication management and Medical management     ASSESSMENT, INTERVENTIONS AND CONTINUING PLAN FOR GOAL:  A&O, CGA w/ walker. Makes needs known, alarms on.  Pt denied pain. Requested PRN atarax at night.  Continent of B/B, LBM 9/27.  at 0200AM.  No further concerns at this time, continue with POC.

## 2020-09-28 NOTE — PLAN OF CARE
FOCUS/GOAL  Medical management    ASSESSMENT, INTERVENTIONS AND CONTINUING PLAN FOR GOAL:  Patient is now MOD I, BG correction insuline given in morning.  Denied pain. Continue with POC.

## 2020-09-28 NOTE — PROGRESS NOTES
"  [facility]   Acute Rehabilitation Unit  Daily progress note    INTERVAL HISTORY  Caroline Rashid was seen resting in bed.  No acute events overnight.  Denies chest pain, shortness of breath, no fever or chills. She had upset stomach yesterday which seems improved.  She is making great gains and will look to discharge this week.  Will restart home metformin.  Labs reviewed no acute issues.     Functional:  PT:  MOD I in room with 4ww - Pt will reach out to insurance company to see if they will cover this. Otherwise will leave with FWW. Limited endurance and decreased durability limiting function at this time.       ROS: 10 point ROS neg other than the symptoms noted above in the HPI.      MEDICATIONS  Scheduled meds    albuterol  2 puff Inhalation TID     aspirin  81 mg Oral Daily     DULoxetine  40 mg Oral Daily     enoxaparin ANTICOAGULANT  70 mg Subcutaneous Q12H     furosemide  20 mg Oral Daily     insulin aspart  1-7 Units Subcutaneous TID AC     insulin aspart  1-5 Units Subcutaneous At Bedtime     insulin glargine  10 Units Subcutaneous At Bedtime     isavuconazonium Sulfate  372 mg Oral Daily     lidocaine  1-2 patch Transdermal Q24H     lidocaine   Transdermal Q8H     melatonin  6 mg Oral At Bedtime     metFORMIN  1,000 mg Oral BID w/meals     metoprolol tartrate  25 mg Oral BID     mirtazapine  15 mg Oral At Bedtime     potassium chloride  20 mEq Oral Daily     vitamin B1  100 mg Oral Daily       PRN meds:  acetaminophen, albuterol, calcium carbonate, glucose **OR** dextrose **OR** glucagon, hydrOXYzine, - MEDICATION INSTRUCTIONS -, polyethylene glycol, traZODone      PHYSICAL EXAM  /81 (BP Location: Right arm)   Pulse 98   Temp 97.9  F (36.6  C) (Oral)   Resp 20   Ht 1.6 m (5' 3\")   Wt 88.7 kg (195 lb 8 oz)   SpO2 96%   BMI 34.63 kg/m    Gen: NAD, sitting up at bedside  HEENT: NCAT, EOMI   Cardio:  Rrr, +s1+S2  Pulm: nonlabored, Clear, no wheezes  Abd: " soft, nontender  Ext: no calf tendernss, no edema  Neuro/MSK: alert speech clear, full AROM and PROM, some limitation on RUE, shoulder area.      LABS    Lab Results   Component Value Date    WBC 6.1 09/28/2020     Lab Results   Component Value Date    RBC 4.33 09/28/2020     Lab Results   Component Value Date    HGB 12.7 09/28/2020     Lab Results   Component Value Date    HCT 38.2 09/28/2020     Lab Results   Component Value Date    MCV 88 09/28/2020     Lab Results   Component Value Date    MCH 29.3 09/28/2020     Lab Results   Component Value Date    MCHC 33.2 09/28/2020     Lab Results   Component Value Date    RDW 15.3 09/28/2020     Lab Results   Component Value Date     09/28/2020           Last Comprehensive Metabolic Panel:  Sodium   Date Value Ref Range Status   09/24/2020 141 133 - 144 mmol/L Final     Potassium   Date Value Ref Range Status   09/24/2020 4.0 3.4 - 5.3 mmol/L Final     Chloride   Date Value Ref Range Status   09/24/2020 107 94 - 109 mmol/L Final     Carbon Dioxide   Date Value Ref Range Status   09/24/2020 25 20 - 32 mmol/L Final     Anion Gap   Date Value Ref Range Status   09/24/2020 9 3 - 14 mmol/L Final     Glucose   Date Value Ref Range Status   09/24/2020 125 (H) 70 - 99 mg/dL Final     Urea Nitrogen   Date Value Ref Range Status   09/24/2020 15 7 - 30 mg/dL Final     Creatinine   Date Value Ref Range Status   09/24/2020 0.76 0.52 - 1.04 mg/dL Final     GFR Estimate   Date Value Ref Range Status   09/24/2020 82 >60 mL/min/[1.73_m2] Final     Comment:     Non  GFR Calc  Starting 12/18/2018, serum creatinine based estimated GFR (eGFR) will be   calculated using the Chronic Kidney Disease Epidemiology Collaboration   (CKD-EPI) equation.       Calcium   Date Value Ref Range Status   09/24/2020 9.4 8.5 - 10.1 mg/dL Final         ASSESSMENT AND PLAN    Caroline Rashid is a 65 year old right hand dominant female who was hospitalized 8/1-8/31 for COVID 19, then [facility]  for vent weaning and medical management 8/31-9/23. Course complicated by hypoxic respiratory failure,aspergillus PNA, NSTEMI, CHICA, encephalopathy. Admitted to acute rehab 9/23 for ongoing rehabilitation and medical management.     Past medical history significant for anxiety, DM type 2, htn, and HLD.         Admission to acute inpatient rehab 9/23 with debilitiy  Impairment group code: 16        1. PT, OT and SLP 60 minutes of each on a daily basis, in addition to rehab nursing and close management of physiatrist.       2. Impairment of ADL's: Noted to have impaired strength, cognition, and activity tolerance with goal for mod I to I with adls.      3. Impairment of mobility:  Noted to have impaired strength, cognition, and activity tolerance with goal for mod I to I with mobility     4. Impairment of cognition/language/swallow:  Scored 10/30 on MoCA with noted impaired memory will benefit from formal cognitive linguistic evaluation.       5. Medical Conditions  Prolonged acute respiratory failure-2/2 aspergillus and COVID.   - s/p trach 8/26 S/p decannulation 9/15  -oxygen to keep sats >90%  -wean as tolerated  -start albuterol inhaler tid and prn  -encourage IS     COVID pneumonia  - Received remdesivir, Dexamethasone  -DVT prophylaxis as below      Aspergillus pneumonia.  CT Chest 9/21 per report with persistent fungal PNA, Case has been D/w Dr Ferrera ID from [facility] until 10/8/20  -  f/u ID clinic.     Metabolic encephalopathy  History of panic disorder, anxiety  - Prior to hospitalization on gabapentin qhs, hydroxyzine prn anxiety. Duloxetine 40 mg daily, trazodone at bedtime prn sleep, remeron 15 mg qhs. Medications titrated given encephalopathy, anxiety, and NPO status during hospitalization.   - continue cymbalta (increase to home dose 40 mg daily),   -continue remeron, melatonin, prn trazodone  -discontinue tenex   -psychology consult for emotional support     Right hand tremor- family  reports as intermittent, new since hospital stay unclear etiology 9/22  could be anxiety related, per chart review has been on propanolol in past though no clear documentation or reported history of essential tremor.      Right shoulder limited range of motion/right upper extremity weakness- active range of motion with abduction and forward flexion at the right shoulder is limited up to 90 degrees, passive range of motion is normal.  She does not have any pain associated with this, and no sensory loss.  Less likely neurogenic and possibly due to some mechanical injury/rotator cuff tendinopathy.    -continue therapies  -consider referral for outpatient follow up     Chronic rt parietal ischemia- noted on CT head 9/22  -continue asa  -secondary stroke prevention      NSTEMI: during initial hospitalization while acutely ill    Echo 8/14 with normal LVEF 60%.   - ASA  - Metoprolol BID  - Lasix  Daily       Diabetes type 2- prior to admission on metformin 1000 mg bid. Did not check bg prior to admission. A1C 9.6% 8/3/30.  - Lantus 10 units at bedtime and ssi  --restart home metformin      HTN- prior to admission on lisinopril 40 mg daily, propanolol 40 mg q am,   continue lasix 20 mg daily, metoprolol 25 mg bid  -monitor bp, there was elevation in 150s friday which alarmed patient, reassured and will continue to monitor.         6. Adjustment to disability:  Clinical psychology to eval and treat  7. FEN: reg  8. Bowel: continent  9. Bladder: continent  10. DVT Prophylaxis: Lovenox at 70mg BID (reduced 9/24 due to heparin 10A level) (therapeutic lovenox started on 9/4, written by pulmonologist) for 29 days till 9/30/20   11. GI Prophylaxis: reg diet  12. Code: full  13. Disposition: home  14. ELOS: 7-8 days.  15. Rehab prognosis:  good  16. Follow up Appointments on Discharge: pcp, infectious disease       Avel Medrano DO        I spent a total of 25 minutes face to face and coordinating care of Caroline Rashid.  Over 50% of my time on the unit was spent counseling the patient and /or coordinating care regarding debility post Covid.

## 2020-09-28 NOTE — PLAN OF CARE
Patient had increased difficulty with moderate level language task to identify ambiguous/abstract definitions of words. Patient with no difficulty completing easy problem-solving scenarios. Some verbal prompting provided assisted in coming up with more personal/functional responses, however.

## 2020-09-28 NOTE — PLAN OF CARE
FOCUS/GOAL  Medication management and Safety management    ASSESSMENT, INTERVENTIONS AND CONTINUING PLAN FOR GOAL:    Pt is alert and oriented by 4. No complaints of pain. Continent of bladder. No BM this shift. Bgs within good range, no correction insulin given at bedtime. Remains SBA with a walker and gait belt for all transfers. Continue with plan of care.

## 2020-09-28 NOTE — PLAN OF CARE
Discharge Planner PT   Patient plan for discharge: Home with HCPT on Wednesday 9/30  Current status: MOD I in room with 4ww - Pt will reach out to insurance company to see if they will cover this. Otherwise will leave with FWW. Limited endurance and decreased durability limiting function at this time. Will be mostly home bound with hopeful progression to OP Pulmonary Rehab after discharge from HCPT.    MD and RN requesting home care as pt has new diabetes education needs and would benefit from home RN for safety during this transition.      Addendum: pt called insurance and states 4WW will be covered 100%. Assisted to order through [facility] and requested delivery to ARU prior to pt's discharge on 9/30.          Entered by: Zelda Barros 09/28/2020 11:44 AM

## 2020-09-28 NOTE — PLAN OF CARE
OT: transfers w/ sba w/ 4 wheeled walker, kitchen mobiity: pt chose to not use any AD but required frequent rest breaks to sit on high chair and sba, pt completed loading , heating up tomato soup on stove top, and retrieving items from knee ht to sh ht cabinets w/ sba, pt used unilater UE 's to stabilized on countertop w/ reaching/bending, pt required sba w/ sweeping but did not use dust pan to  dirt pile. pt reports daughter does the cooking at home.  (-5 min due to lab )    Sonja Elmore , OTR/L, CBIS

## 2020-09-28 NOTE — PROGRESS NOTES
PT (Zelda Barros) and SW called pt family to discuss discharge plans. At first, called qasim Leiva and left VM. Second called dtr Adri. Discussed progress, recommendations for HHC and moving up discharge date to Wed 09/30. Dtr Sharetha in agreement with plan and can provide the transportation. Encouraged dtr to arrive around 9:30am and be a part of discharge ppwk and instruction and discharge before 11am. Luis Manuela will plan for that. Discussed HHC options, pt dtr stated that pt has had [facility] in the past and would prefer to resume with [facility]. Referral for RN, PT, OT, SLP, HHA and SW sent and [facility] willing and able to accept. Contact information for HHC listed in AVS. HHC aware to call qasim Leiva first and Adri second to coordinate SOC. Adri asked about shower bench, OT emailed and requested to f/u. Adri asked about supportive services post discharge. Emailed ([email or web address]) pt dtr the information and article on [facility] (also asked MD to put in referral). Also provided Adri with insurance CC information to get in-network options ([place] PH: [phone]). Discussed PLC education with Adri who recommended that Amarilys (pt other dtr be contacted to arrange). Discussed [facility] (information in previous SW note). EUGENIE emailed dtr the details and OOP cost and discussed a referral with [facility] for CADI waiver. Luis Manuela agreeable to this as well and expressed appreciation. Adri aware of plans moving forward and denied additional needs at this time.     Later spoke with pt son Cali about PLC. Cali stated that he is available to participate in the class as well. Discussed possibly PLC for diabetes tomorrow vs Wed before discharge when Adri is present. Called diabetes educator to discuss conversation with pt son. Diabetes educator planning to call Amarilys this afternoon and to coordinate.     Home Health Care:   [facility] Ph: [phone]  Nurse, physical therapy, occupational therapy, speech therapy, home health aide and      Will call [facility] to initiate the CADI assessment prior to pt discharge and will remain available if additional needs arise.     JENNIFER Garcia, [facility] Acute Rehab Unit   Phone: [phone]  I   Pager: [phone]

## 2020-09-29 ENCOUNTER — APPOINTMENT (OUTPATIENT)
Dept: OCCUPATIONAL THERAPY | Facility: CLINIC | Age: 65
End: 2020-09-29
Payer: COMMERCIAL

## 2020-09-29 ENCOUNTER — APPOINTMENT (OUTPATIENT)
Dept: PHYSICAL THERAPY | Facility: CLINIC | Age: 65
End: 2020-09-29
Payer: COMMERCIAL

## 2020-09-29 ENCOUNTER — APPOINTMENT (OUTPATIENT)
Dept: SPEECH THERAPY | Facility: CLINIC | Age: 65
End: 2020-09-29
Payer: COMMERCIAL

## 2020-09-29 LAB
ALBUMIN SERPL-MCNC: 3.3 G/DL (ref 3.4–5)
ALBUMIN UR-MCNC: NEGATIVE MG/DL
ALP SERPL-CCNC: 148 U/L (ref 40–150)
ALT SERPL W P-5'-P-CCNC: 69 U/L (ref 0–50)
ANION GAP SERPL CALCULATED.3IONS-SCNC: 11 MMOL/L (ref 3–14)
APPEARANCE UR: CLEAR
AST SERPL W P-5'-P-CCNC: 34 U/L (ref 0–45)
BACTERIA #/AREA URNS HPF: ABNORMAL /HPF
BASOPHILS # BLD AUTO: 0 10E9/L (ref 0–0.2)
BASOPHILS NFR BLD AUTO: 0.7 %
BILIRUB DIRECT SERPL-MCNC: 0.1 MG/DL (ref 0–0.2)
BILIRUB SERPL-MCNC: 0.4 MG/DL (ref 0.2–1.3)
BILIRUB UR QL STRIP: NEGATIVE
BUN SERPL-MCNC: 13 MG/DL (ref 7–30)
CALCIUM SERPL-MCNC: 9.6 MG/DL (ref 8.5–10.1)
CHLORIDE SERPL-SCNC: 107 MMOL/L (ref 94–109)
CO2 SERPL-SCNC: 19 MMOL/L (ref 20–32)
COLOR UR AUTO: ABNORMAL
CREAT SERPL-MCNC: 0.9 MG/DL (ref 0.52–1.04)
DIFFERENTIAL METHOD BLD: ABNORMAL
EOSINOPHIL # BLD AUTO: 0.1 10E9/L (ref 0–0.7)
EOSINOPHIL NFR BLD AUTO: 2.4 %
ERYTHROCYTE [DISTWIDTH] IN BLOOD BY AUTOMATED COUNT: 15.9 % (ref 10–15)
GFR SERPL CREATININE-BSD FRML MDRD: 68 ML/MIN/{1.73_M2}
GLUCOSE BLDC GLUCOMTR-MCNC: 128 MG/DL (ref 70–99)
GLUCOSE BLDC GLUCOMTR-MCNC: 130 MG/DL (ref 70–99)
GLUCOSE BLDC GLUCOMTR-MCNC: 153 MG/DL (ref 70–99)
GLUCOSE SERPL-MCNC: 182 MG/DL (ref 70–99)
GLUCOSE UR STRIP-MCNC: NEGATIVE MG/DL
HCT VFR BLD AUTO: 40.3 % (ref 35–47)
HGB BLD-MCNC: 13 G/DL (ref 11.7–15.7)
HGB UR QL STRIP: NEGATIVE
IMM GRANULOCYTES # BLD: 0 10E9/L (ref 0–0.4)
IMM GRANULOCYTES NFR BLD: 0.2 %
KETONES UR STRIP-MCNC: NEGATIVE MG/DL
LACTATE BLD-SCNC: 3.3 MMOL/L (ref 0.7–2)
LACTATE BLD-SCNC: 3.3 MMOL/L (ref 0.7–2)
LACTATE BLD-SCNC: 3.5 MMOL/L (ref 0.7–2)
LEUKOCYTE ESTERASE UR QL STRIP: NEGATIVE
LYMPHOCYTES # BLD AUTO: 2.5 10E9/L (ref 0.8–5.3)
LYMPHOCYTES NFR BLD AUTO: 45.3 %
MCH RBC QN AUTO: 29.2 PG (ref 26.5–33)
MCHC RBC AUTO-ENTMCNC: 32.3 G/DL (ref 31.5–36.5)
MCV RBC AUTO: 91 FL (ref 78–100)
MONOCYTES # BLD AUTO: 0.4 10E9/L (ref 0–1.3)
MONOCYTES NFR BLD AUTO: 6.4 %
MUCOUS THREADS #/AREA URNS LPF: PRESENT /LPF
NEUTROPHILS # BLD AUTO: 2.5 10E9/L (ref 1.6–8.3)
NEUTROPHILS NFR BLD AUTO: 45 %
NITRATE UR QL: NEGATIVE
NRBC # BLD AUTO: 0 10*3/UL
NRBC BLD AUTO-RTO: 0 /100
PH UR STRIP: 5 PH (ref 5–7)
PLATELET # BLD AUTO: 350 10E9/L (ref 150–450)
POTASSIUM SERPL-SCNC: 4 MMOL/L (ref 3.4–5.3)
PROCALCITONIN SERPL-MCNC: 0.1 NG/ML
PROT SERPL-MCNC: 7.4 G/DL (ref 6.8–8.8)
RBC # BLD AUTO: 4.45 10E12/L (ref 3.8–5.2)
RBC #/AREA URNS AUTO: <1 /HPF (ref 0–2)
SODIUM SERPL-SCNC: 137 MMOL/L (ref 133–144)
SOURCE: ABNORMAL
SP GR UR STRIP: 1.01 (ref 1–1.03)
SQUAMOUS #/AREA URNS AUTO: 1 /HPF (ref 0–1)
UROBILINOGEN UR STRIP-MCNC: NORMAL MG/DL (ref 0–2)
WBC # BLD AUTO: 5.5 10E9/L (ref 4–11)
WBC #/AREA URNS AUTO: 7 /HPF (ref 0–5)

## 2020-09-29 PROCEDURE — 25000128 H RX IP 250 OP 636: Performed by: PHYSICIAN ASSISTANT

## 2020-09-29 PROCEDURE — 40000187 ZZH STATISTIC PATIENT MED CONFERENCE < 30 MIN: Performed by: OCCUPATIONAL THERAPIST

## 2020-09-29 PROCEDURE — 25800030 ZZH RX IP 258 OP 636: Performed by: PHYSICIAN ASSISTANT

## 2020-09-29 PROCEDURE — 25000132 ZZH RX MED GY IP 250 OP 250 PS 637: Performed by: PHYSICIAN ASSISTANT

## 2020-09-29 PROCEDURE — 80076 HEPATIC FUNCTION PANEL: CPT | Performed by: PHYSICIAN ASSISTANT

## 2020-09-29 PROCEDURE — 84145 PROCALCITONIN (PCT): CPT | Performed by: PHYSICIAN ASSISTANT

## 2020-09-29 PROCEDURE — 83605 ASSAY OF LACTIC ACID: CPT | Performed by: PHYSICIAN ASSISTANT

## 2020-09-29 PROCEDURE — 81001 URINALYSIS AUTO W/SCOPE: CPT | Performed by: PHYSICIAN ASSISTANT

## 2020-09-29 PROCEDURE — 40000187 ZZH STATISTIC PATIENT MED CONFERENCE < 30 MIN

## 2020-09-29 PROCEDURE — 00000146 ZZHCL STATISTIC GLUCOSE BY METER IP

## 2020-09-29 PROCEDURE — 85025 COMPLETE CBC W/AUTO DIFF WBC: CPT | Performed by: PHYSICAL MEDICINE & REHABILITATION

## 2020-09-29 PROCEDURE — 97535 SELF CARE MNGMENT TRAINING: CPT | Mod: GO | Performed by: OCCUPATIONAL THERAPIST

## 2020-09-29 PROCEDURE — 80048 BASIC METABOLIC PNL TOTAL CA: CPT | Performed by: PHYSICIAN ASSISTANT

## 2020-09-29 PROCEDURE — 40000183 ZZH STATISTIC PT MED CONFERENCE < 30 MIN

## 2020-09-29 PROCEDURE — 12800006 ZZH R&B REHAB

## 2020-09-29 PROCEDURE — 36415 COLL VENOUS BLD VENIPUNCTURE: CPT | Performed by: PHYSICIAN ASSISTANT

## 2020-09-29 PROCEDURE — 97530 THERAPEUTIC ACTIVITIES: CPT | Mod: GP

## 2020-09-29 PROCEDURE — 83605 ASSAY OF LACTIC ACID: CPT | Performed by: PHYSICAL MEDICINE & REHABILITATION

## 2020-09-29 PROCEDURE — 36415 COLL VENOUS BLD VENIPUNCTURE: CPT | Performed by: PHYSICAL MEDICINE & REHABILITATION

## 2020-09-29 RX ORDER — PANTOPRAZOLE SODIUM 40 MG/1
40 TABLET, DELAYED RELEASE ORAL
Status: DISCONTINUED | OUTPATIENT
Start: 2020-09-29 | End: 2020-09-30 | Stop reason: HOSPADM

## 2020-09-29 RX ADMIN — ACETAMINOPHEN 650 MG: 325 TABLET, FILM COATED ORAL at 21:22

## 2020-09-29 RX ADMIN — ENOXAPARIN SODIUM 70 MG: 80 INJECTION SUBCUTANEOUS at 21:25

## 2020-09-29 RX ADMIN — ASPIRIN 81 MG: 81 TABLET, COATED ORAL at 09:46

## 2020-09-29 RX ADMIN — MIRTAZAPINE 15 MG: 7.5 TABLET, FILM COATED ORAL at 21:24

## 2020-09-29 RX ADMIN — HYDROXYZINE HYDROCHLORIDE 25 MG: 25 TABLET, FILM COATED ORAL at 16:12

## 2020-09-29 RX ADMIN — METOPROLOL TARTRATE 25 MG: 25 TABLET, FILM COATED ORAL at 09:47

## 2020-09-29 RX ADMIN — LIDOCAINE 1 PATCH: 560 PATCH PERCUTANEOUS; TOPICAL; TRANSDERMAL at 10:07

## 2020-09-29 RX ADMIN — MELATONIN TAB 3 MG 6 MG: 3 TAB at 21:23

## 2020-09-29 RX ADMIN — THIAMINE HCL TAB 100 MG 100 MG: 100 TAB at 09:46

## 2020-09-29 RX ADMIN — ACETAMINOPHEN 650 MG: 325 TABLET, FILM COATED ORAL at 07:30

## 2020-09-29 RX ADMIN — ENOXAPARIN SODIUM 70 MG: 80 INJECTION SUBCUTANEOUS at 09:46

## 2020-09-29 RX ADMIN — ALBUTEROL SULFATE 2 PUFF: 108 INHALANT RESPIRATORY (INHALATION) at 21:22

## 2020-09-29 RX ADMIN — ALBUTEROL SULFATE 2 PUFF: 108 INHALANT RESPIRATORY (INHALATION) at 09:48

## 2020-09-29 RX ADMIN — FUROSEMIDE 20 MG: 20 TABLET ORAL at 09:50

## 2020-09-29 RX ADMIN — ISAVUCONAZONIUM SULFATE 372 MG: 186 CAPSULE ORAL at 09:47

## 2020-09-29 RX ADMIN — INSULIN ASPART 1 UNITS: 100 INJECTION, SOLUTION INTRAVENOUS; SUBCUTANEOUS at 18:20

## 2020-09-29 RX ADMIN — POTASSIUM CHLORIDE 20 MEQ: 750 TABLET, EXTENDED RELEASE ORAL at 09:46

## 2020-09-29 RX ADMIN — METOPROLOL TARTRATE 25 MG: 25 TABLET, FILM COATED ORAL at 21:23

## 2020-09-29 RX ADMIN — PANTOPRAZOLE SODIUM 40 MG: 40 TABLET, DELAYED RELEASE ORAL at 16:09

## 2020-09-29 RX ADMIN — SODIUM CHLORIDE 1000 ML: 9 INJECTION, SOLUTION INTRAVENOUS at 13:54

## 2020-09-29 RX ADMIN — METFORMIN HYDROCHLORIDE 1000 MG: 500 TABLET ORAL at 09:50

## 2020-09-29 RX ADMIN — DULOXETINE HYDROCHLORIDE 40 MG: 20 CAPSULE, DELAYED RELEASE ORAL at 09:50

## 2020-09-29 NOTE — CONSULTS
"Diabetes Education  Received consult request to meet with this 65 year old female and her family.  Daughter Amarilys attended session today, and had brother on FaceTime.    Discussed general concepts of diabetes, and management.  Provided Amarilys with handouts:  1.  [place] \"Understanding Diabetes\" booklet  2.  Long-acting Insulin  3.  Booklet on Lantus Solostar pen use    Demonstrated use of Lantus pen device; Amarilys taking good notes; Amarilys able to give return demonstration without difficulty.  Caroline not able to give return demonstration; she had difficulty following directions, and bent the pen needle.    Demonstrated use of an Accu-chek Guide monitor kit.  Patient was able to practice use of lancing device but will need guidance with making sure she has an adequate sample, and applying to strip.    Daughter able to do a return demonstration of monitor.    Discussed hypoglycemia signs/symptoms and treatment.    Patient will need assistance of family with diabetes cares.  Discussed with Danii Caputo PA-C.    Ester Hardin MS, APRN, CNS, CDE, CDTC  [phone]    "

## 2020-09-29 NOTE — PLAN OF CARE
Acute Rehab Care Conference/Team Rounds    Type: Team Rounds    Present: Dr. Avel Medrano, Zelda Barros PT, Katerina Madrid OT, Charles Wilson SLP, Renetta TRAORE, Alicia Singh RN, Conor Domínguez, Beverley Yee RD, Charles Wilson SLP    Discharge Barriers/Treatment/Education    Rehab Diagnosis: debility post Covid     Active Medical Co-morbidities/Prognosis: Anemia, encephalopathy with residual cognitive deficits, STEMI, CHICA, aspiration pneumonia/sepsis acute on chronic tremor, right upper extremity weakness and limited range of motion, diabetes, hypertension and anxiety       Safety: She is modified independent in the room using a walker. She called tonight for staff to stand by as she ambulated to and from the bathroom. Plan to discharge on 9/30.    Pain: No c/o pain.     Medications, Skin, Tubes/Lines: she takes pills crushed mixed with apple sauce. Skin is intact. No lines/tubes.     Swallowing/Nutrition: No dysphagia related goals.    Bowel/Bladder: Continent ob B/B, used the bathroom. Independent with nicki cares and clothing management.  LBM on 9/27    Psychosocial: , lives with spouse, adult dtr and 15 y/o granddaughter in a home. Good support from spouse and adult children. No mental health, substance abuse or financial concerns reported.     ADLs/IADLs: Pt is mod IND with ADLs in BR with 4WW. Pt requires SBA with kitchen mobility and meal preparation with 4WW. Pt requires SBA with light home mgmt tasks. Pt requires SBA with bathing while seated on extended tub bench. Pt is mod IND with FB dressing tasks. Pt scored 15/30 on MOCA on 9/24/20. Recommend f/u HC OT services.     Mobility: Pt is now MOD I in room and is ready to discharge tomorrow. Will f/u with HCPT and hopefully progress to OP Pulmonary Rehab. Ordered 4WW from [facility] for home tomorrow.    Cognition/Language: mild impairment    Community Re-Entry: to achieve a level of mod I     Transportation: Not a  - family to  provide    Decision maker: self    Plan of Care and goals reviewed and updated.    Discharge Plan/Recommendations    Fall Precautions: continue    Patient/Family input to goals: yes     Estimated length of stay: 10 -12 days    Overall plan for the patient: achieve a level of mod I       Utilization Review and Continued Stay Justification    Medical Necessity Criteria:    For any criteria that is not met, please document reason and plan for discharge, transfer, or modification of plan of care to address.    Requires intensive rehabilitation program to treat functional deficits?: Yes    Requires 3x per week or greater involvement of rehabilitation physician to oversee rehabilitation program?: Yes    Requires rehabilitation nursing interventions?: Yes    Patient is making functional progress?: Yes    There is a potential for additional functional progress? Yes    Patient is participating in therapy 3 hours per day a minimum of 5 days per week or 15 hours per week in 7 day period?:Yes    Has discharge needs that require coordinated discharge planning approach?:Yes      Barriers/Concerns related to meeting medical necessity criteria:  none    Team Plan to Address Concern:  As needed       Final Physician Sign off    Statement of Approval:  Avel Medrano, DO      Patient Goals  Social Work Goals:Discharge home tomorrow Wed 09/30 with [facility] services and family support. Called Brandi Fung and requested CADI assessment and PCA services. Family aware and agreeable.        OT Frequency: 60-90 min daily  OT goal: hygiene/grooming: independent  OT goal: upper body dressing: Independent  OT goal: lower body dressing: Modified independent  OT goal: upper body bathing: Supervision/stand-by assist  OT goal: lower body bathing: Supervision/stand-by assist  OT Goal: transfer: Modified independent  OT goal: toilet transfer/toileting: Modified independent  OT goal: meal preparation: Supervision/stand-by assist  OT goal: home  management: Supervision/stand-by assist  OT goal: cognitive: Patient/caregiver will verbalize understanding of cognitive assessment results/recommendations as needed for safe discharge planning  OT goal: perform aerobic activity with stable cardiovascular response: continuous activity, 10 minutes  OT goal 1: Pt will demo SBA shower transfer using DME/AE prn  OT goal 2: Pt will demo IND with BUE HEP with focus on strengthening and endurance     PT Frequency: Daily  PT goal: bed mobility: Independent, Supine to/from sit, Rolling, Bridging  PT goal: transfers: Independent, Sit to/from stand, Bed to/from chair, Assistive device  PT goal: gait: Modified independent, Greater than 200 feet, Assistive device  PT goal: stairs: Supervision/stand-by assist, 7 stairs, Rail on right  PT goal: perform aerobic activity with stable cardiovascular response: continuous activity, 20 minutes, ambulation, NuStep     SLP Frequency: Daily  SLP goal 1: Patient will utilize compensatory memory strategies to recall novel information in 90% of opportunities.  SLP goal 2: Patient will complete moderate level reading comprehension tasks with 90% accuracy.  SLP goal 3: Patient will complete moderate level problem solving/reasoning tasks without need for redirection with 90% accuracy.      Nursing Goals  Bowel and Bladder care  Fall prevention   Medication Education  Skin Care protection

## 2020-09-29 NOTE — PLAN OF CARE
Tylenol was effective this AM for headache.  Activated sepsis protocol this AM: 3.3, recheck: 3.5.  Used IV light to start PIV.  22g was attempted but unable to attain IV access w/22g & RN flyer was able to place a 24g for IV bolus (0.9% sodium chloride) running at 250mL/hr.  Able to take pills one at a time in applesauce.  Requested some pills crushed but then had difficulty consuming them & became nauseated.  Recommended taking them whole one a time as she did well with same size medications that are not able to be crushed.  Lg BM this afternoon.  Diabetic educator here to review insulin therapy.

## 2020-09-29 NOTE — PROGRESS NOTES
Completed intake for CADI assessment with [facility]. Sent the following email to pt dtr Jessika:     I called [facility] and completed the intake for the CADI waiver. They wanted me to tell you that it will be roughly 2 weeks before getting a call to schedule the phone assessment. From there, the actual over-the-phone assessment will take about another 2 week. They have both your number and email and Cali's number to coordinate. If you don't get a call within two weeks, they want you to call and follow-up and check-in. That number for [facility] is PH: [phone] opt 0.     Insurance Care Coordinator ([facility] PH: [phone]) updated on pt discharge via voicemail. No additional SW needs at this time. Discharge home tomorrow, pending medical clearance, [facility] set up. Family will transport home. No IMM needed.     JENNIFER Garcia, [facility] Acute Rehab Unit   Phone: [phone]  I   Pager: [phone]

## 2020-09-29 NOTE — PROGRESS NOTES
"  [facility]   Acute Rehabilitation Unit  Daily progress note    INTERVAL HISTORY  Caroline Rashid was seen sitting up in chair, reports some vague complaints of nausea and stomach upset, no diarrhea, no fever, no vomiting, eating ok, denies urinary issues, having regular bowel movements. No breathing issues, no new cough, no chest pain. Heart rate of 102 and a respiratory rate from yesterday of 24 triggered sepsis protocol, afebrile repeat vitals resp rate 18, heart rate 90. Lactic acid 3.2, repeat 3.5 mild rise in cr and slightly low bicarb. Hold lasix, K, recently restarted metformin will hold this as well, CBC WnL, will give 1 liter ns and repeat lactic acid.     See rounds note by Dr. Medrano for further details, ongoing impaired activity tolerance though improved from admission, ongoing impaired cognition seemingly at least somewhat chronic in nature.       MEDICATIONS  Scheduled meds    sodium chloride 0.9%  1,000 mL Intravenous Once     aspirin  81 mg Oral Daily     DULoxetine  40 mg Oral Daily     enoxaparin ANTICOAGULANT  70 mg Subcutaneous Q12H     insulin aspart  1-7 Units Subcutaneous TID AC     insulin aspart  1-5 Units Subcutaneous At Bedtime     insulin glargine  15 Units Subcutaneous At Bedtime     isavuconazonium Sulfate  372 mg Oral Daily     lidocaine  1-2 patch Transdermal Q24H     lidocaine   Transdermal Q8H     melatonin  6 mg Oral At Bedtime     metoprolol tartrate  25 mg Oral BID     mirtazapine  15 mg Oral At Bedtime     pantoprazole  40 mg Oral BID AC     vitamin B1  100 mg Oral Daily       PRN meds:  acetaminophen, albuterol, calcium carbonate, glucose **OR** dextrose **OR** glucagon, hydrOXYzine, - MEDICATION INSTRUCTIONS -, polyethylene glycol, traZODone      PHYSICAL EXAM  /63 (BP Location: Left arm)   Pulse 90   Temp 96.6  F (35.9  C) (Axillary)   Resp 18   Ht 1.6 m (5' 3\")   Wt 88.7 kg (195 lb 8 oz)   SpO2 98%   BMI 34.63 kg/m    Gen: " NAD, sitting up at bedside  HEENT: NCAT, EOMI   Cardio:  Rrr, +s1+S2  Pulm: nonlabored, Clear, no wheezes  Abd: soft, nontender non distended   Ext: no calf tendernss, no edema  Neuro/MSK: up ambulating with walker with therapy.     LABS    Lab Results   Component Value Date    WBC 6.1 09/28/2020     Lab Results   Component Value Date    RBC 4.33 09/28/2020     Lab Results   Component Value Date    HGB 12.7 09/28/2020     Lab Results   Component Value Date    HCT 38.2 09/28/2020     Lab Results   Component Value Date    MCV 88 09/28/2020     Lab Results   Component Value Date    MCH 29.3 09/28/2020     Lab Results   Component Value Date    MCHC 33.2 09/28/2020     Lab Results   Component Value Date    RDW 15.3 09/28/2020     Lab Results   Component Value Date     09/28/2020           Last Comprehensive Metabolic Panel:  Sodium   Date Value Ref Range Status   09/29/2020 137 133 - 144 mmol/L Final     Potassium   Date Value Ref Range Status   09/29/2020 4.0 3.4 - 5.3 mmol/L Final     Comment:     Specimen slightly hemolyzed, potassium may be falsely elevated     Chloride   Date Value Ref Range Status   09/29/2020 107 94 - 109 mmol/L Final     Carbon Dioxide   Date Value Ref Range Status   09/29/2020 19 (L) 20 - 32 mmol/L Final     Anion Gap   Date Value Ref Range Status   09/29/2020 11 3 - 14 mmol/L Final     Glucose   Date Value Ref Range Status   09/29/2020 182 (H) 70 - 99 mg/dL Final     Urea Nitrogen   Date Value Ref Range Status   09/29/2020 13 7 - 30 mg/dL Final     Creatinine   Date Value Ref Range Status   09/29/2020 0.90 0.52 - 1.04 mg/dL Final     GFR Estimate   Date Value Ref Range Status   09/29/2020 68 >60 mL/min/[1.73_m2] Final     Comment:     Non  GFR Calc  Starting 12/18/2018, serum creatinine based estimated GFR (eGFR) will be   calculated using the Chronic Kidney Disease Epidemiology Collaboration   (CKD-EPI) equation.       Calcium   Date Value Ref Range Status   09/29/2020  9.6 8.5 - 10.1 mg/dL Final         ASSESSMENT AND PLAN    Caroline Rashid is a 65 year old right hand dominant female who was hospitalized 8/1-8/31 for COVID 19, then [facility] for vent weaning and medical management 8/31-9/23. Course complicated by hypoxic respiratory failure,aspergillus PNA, NSTEMI, CHICA, encephalopathy. Admitted to acute rehab 9/23 for ongoing rehabilitation and medical management.     Past medical history significant for anxiety, DM type 2, htn, and HLD.         Admission to acute inpatient rehab 9/23 with debilitiy  Impairment group code: 16        1. PT, OT and SLP 60 minutes of each on a daily basis, in addition to rehab nursing and close management of physiatrist.       2. Impairment of ADL's: Noted to have impaired strength, cognition, and activity tolerance with goal for mod I to I with adls.      3. Impairment of mobility:  Noted to have impaired strength, cognition, and activity tolerance with goal for mod I to I with mobility     4. Impairment of cognition/language/swallow:  Scored 10/30 on MoCA with noted impaired memory will benefit from formal cognitive linguistic evaluation.       5. Medical Conditions    Sepsis Evaluation     I was called to see Caroline Rashid due to abnormal vital signs triggering the Sepsis SIRS screening alert. She is known to have recent infection. (aspergillus on tx & recent covid)     Physical Exam   Vital Signs:  Temp: 96.6  F (35.9  C) Temp src: Axillary BP: 119/63 Pulse: 90   Resp: 18 SpO2: 98 % O2 Device: None (Room air)    General: in no acute distress  Mental Status: alert speech clear follows commands.     Remainder of physical exam is significant for: non labored clear on room air, rrr, ab soft non distended non tender. No le edema no rashes.     Data   Lactic Acid   Date Value Ref Range Status   09/29/2020 3.5 (H) 0.7 - 2.0 mmol/L Final     Lactate for Sepsis Protocol   Date Value Ref Range Status   09/29/2020 3.3 (H) 0.7 - 2.0 mmol/L Final        Assessment & Plan   NO EVIDENCE OF SEPSIS at this time.  Vital sign, physical exam, and lab findings are likely due to hydration status, possibly medications.   -hold metformin, lasix, scheduled albuterol  -give 1 liter ns  -repeat lactic acid    Disposition: The patient will remain on the current unit. We will continue to monitor this patient closely..  LOBO Vaughn    Sepsis Criteria   Sepsis: 2+ SIRS criteria due to infection  Severe Sepsis: Sepsis AND 1+ new sign of acute organ dysfunction (Note: lactate >2 or acute encephalopathy each qualify as organ dysfunction)  Septic Shock: Sepsis AND hypotension despite volume resuscitation with 30 ml/kg crystalloid or lactate >=4  Note: HYPOTENSION is defined as 2 BP readings measured 3 hrs apart that have a SBP <90, MAP <65, or decrease >40 mmHg, occurring 6 hrs before or after t-zero    Prolonged acute respiratory failure-2/2 aspergillus and COVID.   - s/p trach 8/26 S/p decannulation 9/15  -oxygen to keep sats >90%  -encourage IS     COVID pneumonia  - Received remdesivir, Dexamethasone  -DVT prophylaxis as below      Aspergillus pneumonia.  CT Chest 9/21 per report with persistent fungal PNA, Case has been D/w Dr Ferrera ID from [facility] until 10/8/20  -  f/u ID clinic scheduled 10/7.     Metabolic encephalopathy  History of panic disorder, anxiety  - Prior to hospitalization on gabapentin qhs, hydroxyzine prn anxiety. Duloxetine 40 mg daily, trazodone at bedtime prn sleep, remeron 15 mg qhs. Medications titrated given encephalopathy, anxiety, and NPO status during hospitalization.   - continue cymbalta (increase to home dose 40 mg daily),   -continue remeron, melatonin, prn trazodone     Right hand tremor- family reports as intermittent, new since hospital stay unclear etiology 9/22  could be anxiety related, per chart review has been on propanolol in past though no clear documentation or reported history of essential tremor.       Right shoulder limited range of motion/right upper extremity weakness- active range of motion with abduction and forward flexion at the right shoulder is limited up to 90 degrees, passive range of motion is normal.  She does not have any pain associated with this, and no sensory loss.  Less likely neurogenic and possibly due to some mechanical injury/rotator cuff tendinopathy.    -continue therapies  -consider referral for outpatient follow up     Chronic rt parietal ischemia- noted on CT head 9/22  -continue asa  -secondary stroke prevention      NSTEMI: during initial hospitalization while acutely ill    Echo 8/14 with normal LVEF 60%.   - ASA  - Metoprolol BID  - Lasix  Daily       Diabetes type 2- prior to admission on metformin 1000 mg bid. Did not check bg prior to admission. A1C 9.6% 8/3/30.  - Lantus 15, ssi  -hold metformin as above.      HTN- prior to admission on lisinopril 40 mg daily, propanolol 40 mg q am  -hold lasix, continue metoprolol  -monitor bp        6. Adjustment to disability:  Clinical psychology to eval and treat  7. FEN: reg  8. Bowel: continent  9. Bladder: continent  10. DVT Prophylaxis: Lovenox at 70mg BID (reduced 9/24 due to heparin 10A level) (therapeutic lovenox started on 9/4, written by pulmonologist) for 29 days till 9/30/20   11. GI Prophylaxis: reg diet  12. Code: full  13. Disposition: home  14. ELOS: 7-8 days.  15. Rehab prognosis:  good  16. Follow up Appointments on Discharge: pcp, infectious disease       Danii Caputo PA-C  PM&R    I spent a total of 40 minutes face-to-face or managing the care of Caroline Rashid.  Over 50% of my time on the unit was spent counseling the patient and coordinating care. See note for details.

## 2020-09-29 NOTE — PROGRESS NOTES
Pt alert/oriented. Pt made MOD I today, no sliding scale insulin given  at dinner and 131 at bedtime. Lantus 10 units given. No pain. Continent of bowel/bladder. Had some chest pain/tingling in chest after albuterol inhaler given. Pt said she had this happen yesterday. Duration of pain on my shift only 2 minutes, left sticky note. VSS, HR 86-99 at highest.

## 2020-09-29 NOTE — PLAN OF CARE
Speech Language Therapy Discharge Summary    Reason for therapy discharge:    Discharged to home with home therapy.    Progress towards therapy goal(s). See goals on Care Plan in [facility] electronic health record for goal details.  Goals not met.  Barriers to achieving goals:   discharge from facility.    Therapy recommendation(s):    Continued therapy is recommended.  Rationale/Recommendations:  Patient presenting with both language impairments as well as cognitive impairments. Able to express her wants and needs without difficulties but with higher level language tasks such as multiple definitions or understanding humor, patient requiring high amount of cueing in order to be successful. Recommend oversight with all mod-complex IADL tasks. Patient had incidental findings of chronic CVA during imaging.

## 2020-09-30 VITALS
BODY MASS INDEX: 34.57 KG/M2 | HEART RATE: 92 BPM | OXYGEN SATURATION: 98 % | RESPIRATION RATE: 18 BRPM | HEIGHT: 63 IN | DIASTOLIC BLOOD PRESSURE: 82 MMHG | WEIGHT: 195.1 LBS | TEMPERATURE: 97.9 F | SYSTOLIC BLOOD PRESSURE: 144 MMHG

## 2020-09-30 LAB
ANION GAP SERPL CALCULATED.3IONS-SCNC: 9 MMOL/L (ref 3–14)
BUN SERPL-MCNC: 10 MG/DL (ref 7–30)
CALCIUM SERPL-MCNC: 9.4 MG/DL (ref 8.5–10.1)
CHLORIDE SERPL-SCNC: 108 MMOL/L (ref 94–109)
CO2 SERPL-SCNC: 23 MMOL/L (ref 20–32)
CREAT SERPL-MCNC: 0.92 MG/DL (ref 0.52–1.04)
ERYTHROCYTE [DISTWIDTH] IN BLOOD BY AUTOMATED COUNT: 15.7 % (ref 10–15)
GFR SERPL CREATININE-BSD FRML MDRD: 66 ML/MIN/{1.73_M2}
GLUCOSE BLDC GLUCOMTR-MCNC: 115 MG/DL (ref 70–99)
GLUCOSE BLDC GLUCOMTR-MCNC: 132 MG/DL (ref 70–99)
GLUCOSE SERPL-MCNC: 186 MG/DL (ref 70–99)
HCT VFR BLD AUTO: 37.6 % (ref 35–47)
HGB BLD-MCNC: 12.5 G/DL (ref 11.7–15.7)
LACTATE BLD-SCNC: 2.4 MMOL/L (ref 0.7–2)
MCH RBC QN AUTO: 29.1 PG (ref 26.5–33)
MCHC RBC AUTO-ENTMCNC: 33.2 G/DL (ref 31.5–36.5)
MCV RBC AUTO: 88 FL (ref 78–100)
PLATELET # BLD AUTO: 336 10E9/L (ref 150–450)
POTASSIUM SERPL-SCNC: 3.9 MMOL/L (ref 3.4–5.3)
RBC # BLD AUTO: 4.29 10E12/L (ref 3.8–5.2)
SODIUM SERPL-SCNC: 140 MMOL/L (ref 133–144)
WBC # BLD AUTO: 5.2 10E9/L (ref 4–11)

## 2020-09-30 PROCEDURE — 25000128 H RX IP 250 OP 636: Performed by: PHYSICIAN ASSISTANT

## 2020-09-30 PROCEDURE — 80048 BASIC METABOLIC PNL TOTAL CA: CPT | Performed by: PHYSICIAN ASSISTANT

## 2020-09-30 PROCEDURE — 87040 BLOOD CULTURE FOR BACTERIA: CPT | Performed by: PHYSICIAN ASSISTANT

## 2020-09-30 PROCEDURE — 00000146 ZZHCL STATISTIC GLUCOSE BY METER IP

## 2020-09-30 PROCEDURE — 25000132 ZZH RX MED GY IP 250 OP 250 PS 637: Performed by: PHYSICIAN ASSISTANT

## 2020-09-30 PROCEDURE — 36415 COLL VENOUS BLD VENIPUNCTURE: CPT | Performed by: PHYSICIAN ASSISTANT

## 2020-09-30 PROCEDURE — 25000128 H RX IP 250 OP 636: Performed by: PHYSICAL MEDICINE & REHABILITATION

## 2020-09-30 PROCEDURE — 83605 ASSAY OF LACTIC ACID: CPT | Performed by: PHYSICIAN ASSISTANT

## 2020-09-30 PROCEDURE — 90732 PPSV23 VACC 2 YRS+ SUBQ/IM: CPT | Performed by: PHYSICAL MEDICINE & REHABILITATION

## 2020-09-30 PROCEDURE — 85027 COMPLETE CBC AUTOMATED: CPT | Performed by: PHYSICIAN ASSISTANT

## 2020-09-30 RX ORDER — ALBUTEROL SULFATE 90 UG/1
2 AEROSOL, METERED RESPIRATORY (INHALATION) EVERY 4 HOURS PRN
Qty: 1 INHALER | Refills: 0 | Status: SHIPPED | OUTPATIENT
Start: 2020-09-30 | End: 2020-10-01

## 2020-09-30 RX ORDER — DULOXETINE 40 MG/1
40 CAPSULE, DELAYED RELEASE ORAL DAILY
Qty: 60 CAPSULE | Refills: 0 | Status: SHIPPED | OUTPATIENT
Start: 2020-10-01 | End: 2020-10-01

## 2020-09-30 RX ORDER — LANOLIN ALCOHOL/MO/W.PET/CERES
100 CREAM (GRAM) TOPICAL DAILY
Qty: 30 TABLET | Refills: 0 | Status: SHIPPED | OUTPATIENT
Start: 2020-09-30 | End: 2020-10-01

## 2020-09-30 RX ORDER — MIRTAZAPINE 15 MG/1
15 TABLET, FILM COATED ORAL AT BEDTIME
Qty: 30 TABLET | Refills: 0 | Status: SHIPPED | OUTPATIENT
Start: 2020-09-30 | End: 2020-10-01

## 2020-09-30 RX ORDER — TRAZODONE HYDROCHLORIDE 50 MG/1
50 TABLET, FILM COATED ORAL
Qty: 30 TABLET | Refills: 0 | Status: SHIPPED | OUTPATIENT
Start: 2020-09-30 | End: 2020-10-01

## 2020-09-30 RX ORDER — CONTAINER,EMPTY
EACH MISCELLANEOUS
Qty: 1 EACH | Refills: 0 | Status: SHIPPED | OUTPATIENT
Start: 2020-09-30

## 2020-09-30 RX ORDER — ASPIRIN 81 MG/1
81 TABLET, CHEWABLE ORAL DAILY
Qty: 30 TABLET | Refills: 0 | Status: SHIPPED | OUTPATIENT
Start: 2020-09-30 | End: 2020-10-01

## 2020-09-30 RX ORDER — LANOLIN ALCOHOL/MO/W.PET/CERES
6 CREAM (GRAM) TOPICAL
Qty: 30 TABLET | Refills: 0 | Status: SHIPPED | OUTPATIENT
Start: 2020-09-30 | End: 2020-10-01

## 2020-09-30 RX ORDER — BLOOD PRESSURE TEST KIT
KIT MISCELLANEOUS
Qty: 100 EACH | Refills: 0 | Status: SHIPPED | OUTPATIENT
Start: 2020-09-30

## 2020-09-30 RX ORDER — PANTOPRAZOLE SODIUM 40 MG/1
40 TABLET, DELAYED RELEASE ORAL
Qty: 60 TABLET | Refills: 0 | Status: SHIPPED | OUTPATIENT
Start: 2020-09-30 | End: 2020-10-01

## 2020-09-30 RX ORDER — METOPROLOL TARTRATE 25 MG/1
25 TABLET, FILM COATED ORAL 2 TIMES DAILY
Qty: 60 TABLET | Refills: 0 | Status: SHIPPED | OUTPATIENT
Start: 2020-09-30 | End: 2020-10-01

## 2020-09-30 RX ADMIN — ASPIRIN 81 MG: 81 TABLET, COATED ORAL at 08:30

## 2020-09-30 RX ADMIN — THIAMINE HCL TAB 100 MG 100 MG: 100 TAB at 08:31

## 2020-09-30 RX ADMIN — PNEUMOCOCCAL VACCINE POLYVALENT 0.5 ML
25; 25; 25; 25; 25; 25; 25; 25; 25; 25; 25; 25; 25; 25; 25; 25; 25; 25; 25; 25; 25; 25; 25 INJECTION, SOLUTION INTRAMUSCULAR; SUBCUTANEOUS at 10:54

## 2020-09-30 RX ADMIN — ISAVUCONAZONIUM SULFATE 372 MG: 186 CAPSULE ORAL at 08:30

## 2020-09-30 RX ADMIN — ENOXAPARIN SODIUM 70 MG: 80 INJECTION SUBCUTANEOUS at 08:31

## 2020-09-30 RX ADMIN — ACETAMINOPHEN 650 MG: 325 TABLET, FILM COATED ORAL at 08:30

## 2020-09-30 RX ADMIN — DULOXETINE HYDROCHLORIDE 40 MG: 20 CAPSULE, DELAYED RELEASE ORAL at 08:30

## 2020-09-30 RX ADMIN — PANTOPRAZOLE SODIUM 40 MG: 40 TABLET, DELAYED RELEASE ORAL at 08:40

## 2020-09-30 RX ADMIN — METOPROLOL TARTRATE 25 MG: 25 TABLET, FILM COATED ORAL at 08:31

## 2020-09-30 RX ADMIN — LIDOCAINE 1 PATCH: 560 PATCH PERCUTANEOUS; TOPICAL; TRANSDERMAL at 08:31

## 2020-09-30 ASSESSMENT — MIFFLIN-ST. JEOR: SCORE: 1404.1

## 2020-09-30 NOTE — PLAN OF CARE
FOCUS/GOAL  Medical management    ASSESSMENT, INTERVENTIONS AND CONTINUING PLAN FOR GOAL:  Patient was upset when she was awakened for blood draw for blood culture at 0030, she asked if her other AM blood draws could be performed at the same time, she was informed that those other labs have to wait for AM per MD orders, one of them is Lactic Acid level.  She is modified independent in the room using a walker. No c/o pain. Patient declined lab draws this AM, writer explained to her that these labs are important to be performed, they may determine when she will be discharged, she still declined. She is flustrated because of uncertainty about her discharge date.

## 2020-09-30 NOTE — DISCHARGE INSTRUCTIONS
Follow Up Appointment(s):    - Infectious Disease  You are scheduled to see Dr. Adela Shukla on Wednesday, October 7th at 10:00 AM.    Address  [facility]                          [address]                          [address] Floor                          [place] [address]  Phone   [phone]  Fax                  [phone]    - Primary Care  You are scheduled to see Dr. Michelle Mays on Wednesday, October 14th at 1:30 PM. If you wish to change this appointment to meet with your normal primary care provider, please call [phone].    Address  [facility]                          [address]                          [place] [address]  Phone   [phone]             ---------------------------  Home Health Care:   [facility] Ph: [phone]  Nurse, physical therapy, occupational therapy, speech therapy, home health aide and

## 2020-09-30 NOTE — DISCHARGE SUMMARY
[facility]   Acute Rehabilitation Unit  Discharge summary     Date of Admission: 9/23/2020  Date of Discharge: 9/30/2020  Disposition: home with home care  Primary Care Physician: Gadiel Evans  Attending physician: Avel Medrano DO        discharge diagnosis      Debility post Covid    1. Impaired functional mobility  2. Impaired ADLs  3. Impaired cognition       Other issues monitored/addressed:  Anemia, encephalopathy with residual cognitive deficits, STEMI, CHICA, aspiration pneumonia/sepsis acute on chronic tremor, right upper extremity weakness and limited range of motion, diabetes, hypertension and anxiety      brief summary  Caroline Rashid is a 65 year old woman with a past medical history of htn, DMII, who was admitted 8/ for hypoxic resp failure 2/2 to [facility]. Was intubated, paralyzed and proned. Course complicated by worsening renal function as well as NSTEMI with +trops. Pt extubated 8/8 with position changing/proning. requiried emergen reintubation. Course further complicated by evidence of Aspergillus in sputum as well as yeast in blood, Chica,  prolonged hypoxic respiratory failure, encephalopathy. She was trached 8/26/20, transferred to [facility] for ongoing management 8/31. While at [facility] was de cannulated 9/15, encephalopathy improved and was transitioned to regular diet.     During her LTACH stay, patient was seen and evaluated by PT and OT, who collectively recommended that patient would benefit from ongoing therapies in the acute inpatient rehabilitation setting.  She was admitted to ARU on 9/23.          rehabilitaiton course    Home with HCPT on Wednesday 9/30  Current status: MOD I in room with 4ww - Pt will reach out to insurance company to see if they will cover this. Otherwise will leave with FWW. Limited endurance and decreased durability limiting function at this time. Will be mostly home bound with hopeful progression to OP  Pulmonary Rehab [facility]        mEDICAL COURSE    Prolonged acute respiratory failure-2/2 aspergillus and COVID.   - s/p trach 8/26 S/p decannulation 9/15  -oxygen to keep sats >90%  -wean as tolerated  -start albuterol inhaler tid and prn  -encourage IS     COVID pneumonia  - Received remdesivir, Dexamethasone  -DVT prophylaxis as below      Aspergillus pneumonia.  CT Chest 9/21 per report with persistent fungal PNA, Case has been D/w Dr Ferrera ID from [facility] until 10/8/20  -  f/u ID clinic.     Metabolic encephalopathy  History of panic disorder, anxiety  - Prior to hospitalization on gabapentin qhs, hydroxyzine prn anxiety. Duloxetine 40 mg daily, trazodone at bedtime prn sleep, remeron 15 mg qhs. Medications titrated given encephalopathy, anxiety, and NPO status during hospitalization.   - continue cymbalta (increase to home dose 40 mg daily),   -continue remeron, melatonin, prn trazodone  -discontinue tenex   -psychology consult for emotional support     Right hand tremor- family reports as intermittent, new since hospital stay unclear etiology 9/22  could be anxiety related, per chart review has been on propanolol in past though no clear documentation or reported history of essential tremor.  --much improved at discharge      Right shoulder limited range of motion/right upper extremity weakness- active range of motion with abduction and forward flexion at the right shoulder is limited up to 90 degrees, passive range of motion is normal.  She does not have any pain associated with this, and no sensory loss.  Less likely neurogenic and possibly due to some mechanical injury/rotator cuff tendinopathy.    -continue therapies  -consider referral for outpatient follow up     Chronic rt parietal ischemia- noted on CT head 9/22  -continue asa  -secondary stroke prevention      NSTEMI: during initial hospitalization while acutely ill    Echo 8/14 with normal LVEF 60%.   - ASA  -  Metoprolol BID  - Lasix  Daily  - held due to dehydration      Diabetes type 2- prior to admission on metformin 1000 mg bid. Did not check bg prior to admission. A1C 9.6% 8/3/30.  - Lantus 15 units at bedtime   --reach out to PCP for restarting metformin and insulin adjustments.      HTN- prior to admission on lisinopril 40 mg daily (held due to dehydration), propanolol 40 mg q am,   continue lasix 20 mg daily ( held due to dehydration) , metoprolol 25 mg bid              dISCHARGE MEDICATIONS  Discharge Medication List as of 9/30/2020 11:00 AM      START taking these medications    Details   albuterol (PROAIR HFA/PROVENTIL HFA/VENTOLIN HFA) 108 (90 Base) MCG/ACT inhaler Inhale 2 puffs into the lungs every 4 hours as needed for wheezing or shortness of breath / dyspnea, Disp-1 Inhaler,R-0, E-PrescribePharmacy may dispense brand covered by insurance (Proair, or proventil or ventolin or generic albuterol inhaler)      Alcohol Swabs PADS Use to swab the area of the injection or juliocesar as directed   Per insurance coverage, Disp-100 each,R-0, Local Print      blood glucose (NO BRAND SPECIFIED) lancets standard To use to test glucose level in the blood  Use to test blood sugar BID times daily as directed. To accompany glucose monitor brands per insurance coverage.Disp-100 each,R-0Local Print      blood glucose (NO BRAND SPECIFIED) test strip To use to test glucose level in the blood  Use to test blood sugar  BID  times daily as directed. To accompany glucose monitor brands per insurance coverage., Disp-100 each,R-0, Local Print      blood glucose monitoring (NO BRAND SPECIFIED) meter device kit Use as directed   Per insurance coverageDisp-1 kit,R-0Local Print      insulin pen needle (32G X 4 MM) 32G X 4 MM miscellaneous Use as directed by provider  Per insurance coverageDisp-100 each,R-0Local Print      pantoprazole (PROTONIX) 40 MG EC tablet Take 1 tablet (40 mg) by mouth 2 times daily (before meals), Disp-60 tablet,R-0,  E-Prescribe      Sharps Container MISC Use as directed to dispose of needles, lancets and other sharps  Per Insurance coverage, Disp-1 each,R-0, Local Print         CONTINUE these medications which have CHANGED    Details   aspirin (ASA) 81 MG chewable tablet Take 1 tablet (81 mg) by mouth daily, Disp-30 tablet,R-0, E-Prescribe      DULoxetine 40 MG CPEP Take 40 mg by mouth daily, Disp-60 capsule,R-0, E-Prescribe      insulin glargine (LANTUS PEN) 100 UNIT/ML pen Inject 15 Units Subcutaneous At Bedtime, Disp-4.5 mL,R-0, E-PrescribeIf Lantus is not covered by insurance, may substitute Basaglar at same dose and frequency.        isavuconazonium Sulfate (CRESEMBA) 186 MG CAPS capsule Take 2 capsules (372 mg) by mouth daily for 9 doses, Disp-18 capsule,R-0, E-Prescribe      metoprolol tartrate (LOPRESSOR) 25 MG tablet Take 1 tablet (25 mg) by mouth 2 times daily, Disp-60 tablet,R-0, E-Prescribe      mirtazapine (REMERON) 15 MG tablet Take 1 tablet (15 mg) by mouth At Bedtime, Disp-30 tablet,R-0, E-Prescribe      thiamine (B-1) 100 MG tablet Take 1 tablet (100 mg) by mouth daily, Disp-30 tablet,R-0, E-Prescribe      traZODone (DESYREL) 50 MG tablet Take 1 tablet (50 mg) by mouth nightly as needed for sleep, Disp-30 tablet,R-0, E-Prescribe         CONTINUE these medications which have NOT CHANGED    Details   !! melatonin 3 MG tablet Take 2 tablets (6 mg) by mouth nightly as needed for sleep, Disp-30 tablet,R-0, E-Prescribe      !! melatonin 3 MG tablet Take 6 mg by mouth At Bedtime, Historical       !! - Potential duplicate medications found. Please discuss with provider.      STOP taking these medications       acetaminophen (TYLENOL) 325 MG tablet Comments:   Reason for Stopping:         calcium carbonate (TUMS) 500 MG chewable tablet Comments:   Reason for Stopping:         enoxaparin ANTICOAGULANT (LOVENOX) 100 MG/ML syringe Comments:   Reason for Stopping:         furosemide (LASIX) 20 MG tablet Comments:   Reason  for Stopping:         guaiFENesin (ROBITUSSIN) 100 MG/5ML SYRP Comments:   Reason for Stopping:         guanFACINE (TENEX) 1 MG tablet Comments:   Reason for Stopping:         insulin lispro (HUMALOG KWIKPEN) 100 UNIT/ML (1 unit dial) KWIKPEN Comments:   Reason for Stopping:         Lidocaine (LIDOCARE) 4 % Patch Comments:   Reason for Stopping:         LORazepam (ATIVAN) 0.5 MG tablet Comments:   Reason for Stopping:         potassium chloride ER (KLOR-CON M) 20 MEQ CR tablet Comments:   Reason for Stopping:         QUEtiapine (SEROQUEL) 25 MG tablet Comments:   Reason for Stopping:         venlafaxine (EFFEXOR) 37.5 MG tablet Comments:   Reason for Stopping:                 DISCHARGE INSTRUCTIONS AND FOLLOW UP  Discharge Procedure Orders   Home care nursing referral   Referral Priority: Routine Referral Type: Home Health Therapies & Aides   Number of Visits Requested: 1     Home Care PT Referral for Hospital Discharge   Referral Priority: Routine Referral Type: Home Health Therapies & Aides   Number of Visits Requested: 1     Home Care OT Referral for Hospital Discharge   Referral Priority: Routine   Number of Visits Requested: 1     Home Care SLP Referral for Hospital Discharge   Referral Priority: Routine   Number of Visits Requested: 1     Reason for your hospital stay   Order Comments: Debility post Covid     Adult [facility] Follow-up and recommended labs and tests   Order Comments: Follow up with primary care provider, Gadiel Evans, within 7 days for hospital follow- up.      Appointments on [place] and/or [facility] (with [facility] or [facility] provider or service). Call [phone] if you haven't heard regarding these appointments within 7 days of discharge.     Activity   Order Comments: Your activity upon discharge: activity as tolerated and no driving     Order Specific Question Answer Comments   Is discharge order? Yes      MD face to face encounter   Order Comments: Documentation of Face to Face and  Certification for Home Health Services    I certify that patient: Caroline Rashid is under my care and that I, or a nurse practitioner or physician's assistant working with me, had a face-to-face encounter that meets the physician face-to-face encounter requirements with this patient on: 9/30/2020.    This encounter with the patient was in whole, or in part, for the following medical condition, which is the primary reason for home health care: debility post Covid.    I certify that, based on my findings, the following services are medically necessary home health services: Nursing, Occupational Therapy, Physical Therapy, Speech Language Therapy and Social Work.    My clinical findings support the need for the above services because: Nurse is needed: To assess diabetes and clinical status after changes in medications or other medical regimen.., Occupational Therapy Services are needed to assess and treat cognitive ability and address ADL safety due to impairment in endurance., Physical Therapy Services are needed to assess and treat the following functional impairments: in endurance. and Speech Therapy Services are needed to assess and treat impairments in language and/or swallow functions due to mild cognitive performance post Covid.    Further, I certify that my clinical findings support that this patient is homebound (i.e. absences from home require considerable and taxing effort and are for medical reasons or [facility] services or infrequently or of short duration when for other reasons) because: Requires assistance of another person or specialized equipment to access medical services because patient: Is unable to walk greater than 150 feet without rest...    Based on the above findings. I certify that this patient is confined to the home and needs intermittent skilled nursing care, physical therapy and/or speech therapy.  The patient is under my care, and I have initiated the establishment of the plan of care.   This patient will be followed by a physician who will periodically review the plan of care.  Physician/Provider to provide follow up care: Gadiel Evans    Attending hospital physician (the Medicare certified PECOS provider): Avel Medrano DO  Physician Signature: See electronic signature associated with these discharge orders.  Date: 9/30/2020     Monitor and record   Order Comments: blood glucose : three times daily- follow up numbers with primary care.     Diet   Order Comments: Follow this diet upon discharge: Orders Placed This Encounter      Room Service      Combination Diet Regular Diet Adult     Order Specific Question Answer Comments   Is discharge order? Yes           physical examination    Most recent Vital Signs:   Vitals:    09/29/20 1631 09/29/20 2113 09/30/20 0700 09/30/20 0757   BP: (!) 140/63 131/86  (!) 144/82   BP Location: Left arm Left arm  Right arm   Pulse: 91 99  92   Resp: 16 16  18   Temp: 96  F (35.6  C) 99  F (37.2  C)  97.9  F (36.6  C)   TempSrc: Oral Oral  Oral   SpO2: 100% 100%  98%   Weight:   88.5 kg (195 lb 1.6 oz)    Height:           Gen: NAD, sitting up at bedside  HEENT: NCAT, EOMI   Cardio:  Rrr, +s1+S2  Pulm: nonlabored, Clear, no wheezes  Abd: soft, nontender  Ext: no calf tendernss, no edema  Neuro/MSK: alert speech clear, full AROM and PROM, some limitation on RUE, shoulder area.               Discharge summary was forwarded to Gadiel Evans (PCP) at the time of discharge, so as to bridge from hospital to outpatient care.     It was our pleasure to care for Caroline Rashid during this hospitalization. Please do not hesitate to contact me should there be questions regarding the hospital course or discharge plan.        Avel Medrano DO  Physical Medicine & Rehabilitation      I, Avel Medrano DO, saw and evaluated this patient prior to discharge. 35 minutes spent in discharge, including >50% in counseling and coordination of care, medication review  and plan of care recommended on follow up.

## 2020-10-01 ENCOUNTER — TELEPHONE (OUTPATIENT)
Dept: PHYSICAL MEDICINE AND REHAB | Facility: CLINIC | Age: 65
End: 2020-10-01

## 2020-10-01 DIAGNOSIS — R53.81 DEBILITY: ICD-10-CM

## 2020-10-01 DIAGNOSIS — E11.69 TYPE 2 DIABETES MELLITUS WITH OTHER SPECIFIED COMPLICATION, UNSPECIFIED WHETHER LONG TERM INSULIN USE (H): ICD-10-CM

## 2020-10-01 RX ORDER — MIRTAZAPINE 15 MG/1
15 TABLET, FILM COATED ORAL AT BEDTIME
Qty: 30 TABLET | Refills: 0 | Status: SHIPPED | OUTPATIENT
Start: 2020-10-01 | End: 2020-10-01

## 2020-10-01 RX ORDER — METOPROLOL TARTRATE 25 MG/1
25 TABLET, FILM COATED ORAL 2 TIMES DAILY
Qty: 60 TABLET | Refills: 0 | Status: SHIPPED | OUTPATIENT
Start: 2020-10-01 | End: 2020-10-01

## 2020-10-01 RX ORDER — ALBUTEROL SULFATE 90 UG/1
2 AEROSOL, METERED RESPIRATORY (INHALATION) EVERY 4 HOURS PRN
Qty: 1 INHALER | Refills: 0 | Status: SHIPPED | OUTPATIENT
Start: 2020-10-01 | End: 2020-10-01

## 2020-10-01 RX ORDER — LANOLIN ALCOHOL/MO/W.PET/CERES
100 CREAM (GRAM) TOPICAL DAILY
Qty: 30 TABLET | Refills: 0 | Status: SHIPPED | OUTPATIENT
Start: 2020-10-01 | End: 2020-10-01

## 2020-10-01 RX ORDER — PANTOPRAZOLE SODIUM 40 MG/1
40 TABLET, DELAYED RELEASE ORAL
Qty: 60 TABLET | Refills: 0 | Status: SHIPPED | OUTPATIENT
Start: 2020-10-01

## 2020-10-01 RX ORDER — DULOXETINE 40 MG/1
40 CAPSULE, DELAYED RELEASE ORAL DAILY
Qty: 60 CAPSULE | Refills: 0 | Status: SHIPPED | OUTPATIENT
Start: 2020-10-01

## 2020-10-01 RX ORDER — METOPROLOL TARTRATE 25 MG/1
25 TABLET, FILM COATED ORAL 2 TIMES DAILY
Qty: 60 TABLET | Refills: 0 | Status: SHIPPED | OUTPATIENT
Start: 2020-10-01

## 2020-10-01 RX ORDER — DULOXETINE 40 MG/1
40 CAPSULE, DELAYED RELEASE ORAL DAILY
Qty: 60 CAPSULE | Refills: 0 | Status: SHIPPED | OUTPATIENT
Start: 2020-10-01 | End: 2020-10-01

## 2020-10-01 RX ORDER — ALBUTEROL SULFATE 90 UG/1
2 AEROSOL, METERED RESPIRATORY (INHALATION) EVERY 4 HOURS PRN
Qty: 1 INHALER | Refills: 0 | Status: SHIPPED | OUTPATIENT
Start: 2020-10-01

## 2020-10-01 RX ORDER — LANOLIN ALCOHOL/MO/W.PET/CERES
100 CREAM (GRAM) TOPICAL DAILY
Qty: 30 TABLET | Refills: 0 | Status: SHIPPED | OUTPATIENT
Start: 2020-10-01

## 2020-10-01 RX ORDER — LANOLIN ALCOHOL/MO/W.PET/CERES
6 CREAM (GRAM) TOPICAL
Qty: 30 TABLET | Refills: 0 | Status: SHIPPED | OUTPATIENT
Start: 2020-10-01

## 2020-10-01 RX ORDER — MIRTAZAPINE 15 MG/1
15 TABLET, FILM COATED ORAL AT BEDTIME
Qty: 30 TABLET | Refills: 0 | Status: SHIPPED | OUTPATIENT
Start: 2020-10-01

## 2020-10-01 RX ORDER — TRAZODONE HYDROCHLORIDE 50 MG/1
50 TABLET, FILM COATED ORAL
Qty: 30 TABLET | Refills: 0 | Status: SHIPPED | OUTPATIENT
Start: 2020-10-01

## 2020-10-01 RX ORDER — ASPIRIN 81 MG/1
81 TABLET, CHEWABLE ORAL DAILY
Qty: 30 TABLET | Refills: 0 | Status: SHIPPED | OUTPATIENT
Start: 2020-10-01

## 2020-10-01 RX ORDER — TRAZODONE HYDROCHLORIDE 50 MG/1
50 TABLET, FILM COATED ORAL
Qty: 30 TABLET | Refills: 0 | Status: SHIPPED | OUTPATIENT
Start: 2020-10-01 | End: 2020-10-01

## 2020-10-01 NOTE — TELEPHONE ENCOUNTER
Prior Authorization Retail Medication Request    Medication/Dose: DULoxetine HCl 40 MG CPEP  ICD code (if different than what is on RX):  Debility (R53.81)  Previously Tried and Failed:    Rationale:      Insurance Name: [employer]   Insurance ID: [id number]      Pharmacy Information (if different than what is on RX)  Name:  [id number] - [place], [place] - [address] AT [facility] OF [address]   Phone: [phone]

## 2020-10-01 NOTE — PROGRESS NOTES
Occupational Therapy Discharge Summary    Reason for therapy discharge:    Discharged to home with home therapy.    Progress towards therapy goal(s). See goals on Care Plan in [facility] electronic health record for goal details.  Goals partially met.  Barriers to achieving goals:   discharge from facility.    Therapy recommendation(s):    Continued therapy is recommended.  Rationale/Recommendations:  Recommend HC OT services to complete home safety evaluation, and to increase IND and safety with ADLs/IADLs and functional mobility.. Pt is mod IND with ADLs in BR with 4WW. Pt requires SBA with kitchen mobility and meal preparation with 4WW. Pt requires SBA with light home mgmt tasks. Pt requires SBA with bathing while seated on extended tub bench. Pt is mod IND with FB dressing tasks. Pt scored 15/30 on MOCA on 9/24/20. Provided pt with BUE HEP with blue resistive foam and yellow thera-putty to increase bilateral hand strengthening and FMC skills.

## 2020-10-01 NOTE — PROGRESS NOTES
Patient discharged from unit via w/c, escorted by ARU staff and daughter.  Discharge summary and where to  meds was reviewed with daughter and patient.  Patient was given pneumococcal vaccination.  Patient denies pain, MD signed off on discharge and discussed follow up plan with family as well.

## 2020-10-02 NOTE — TELEPHONE ENCOUNTER
PA Initiation    Medication: DULoxetine HCl 40 MG CPEP -   Insurance Company: [employer] - Phone [phone] Fax [phone]  Pharmacy Filling the Rx: [id number] - [place] - [address] AT [facility] OF  [address]  Filling Pharmacy Phone: [phone]  Filling Pharmacy Fax: [phone]  Start Date: 10/2/2020

## 2020-10-06 LAB
BACTERIA SPEC CULT: NO GROWTH
BACTERIA SPEC CULT: NO GROWTH
Lab: NORMAL
Lab: NORMAL
SPECIMEN SOURCE: NORMAL
SPECIMEN SOURCE: NORMAL

## 2020-10-08 NOTE — TELEPHONE ENCOUNTER
Prior Authorization Approval    Medication: DULoxetine HCl 40 MG CPEP -  was approved on    Effective:   to    Reference #:    Approved Dose/Quantity:   Insurance Company: [employer] - Phone [phone] Fax [phone]  Expected CoPay:    Pharmacy Filling the Rx: [facility] #[id number] - [place], [place] - [address] AT [facility] OF  [address]  Pharmacy Notified: Yes  Patient Notified: Comment:  Pt picked up 20MG caps (2QD)on 10/01 per jose g Dumont. [facility] will note in pts chart that 40MG caps are now approved.

## 2020-12-04 ENCOUNTER — DOCUMENTATION ONLY (OUTPATIENT)
Dept: CARE COORDINATION | Facility: CLINIC | Age: 65
End: 2020-12-04
Payer: COMMERCIAL

## 2022-05-05 NOTE — PLAN OF CARE
Alert and oriented x4. Continent of bowel and bladder in the toilet. Last BM 9/28. Headache reported this evening; mostly resolved with rest and acetaminophen. Mod I for transfers. QID blood sugars. Sepsis protocol triggered on day shift. PIV placed in R arm; patent and saline locked. UA and UC collected by this writer and sent to lab. Regular thin diet. Call light within reach, Ok to continue with care plan.     No

## 2024-01-15 ENCOUNTER — TRANSCRIBE ORDERS (OUTPATIENT)
Dept: OTHER | Age: 69
End: 2024-01-15

## 2024-01-15 DIAGNOSIS — S46.811A STRAIN OF TRAPEZIUS MUSCLE, RIGHT, INITIAL ENCOUNTER: ICD-10-CM

## 2024-01-15 DIAGNOSIS — M54.12 CERVICAL RADICULOPATHY: Primary | ICD-10-CM

## 2025-03-26 ENCOUNTER — APPOINTMENT (OUTPATIENT)
Dept: URBAN - METROPOLITAN AREA CLINIC 254 | Age: 70
Setting detail: DERMATOLOGY
End: 2025-03-27

## 2025-03-26 DIAGNOSIS — L82.0 INFLAMED SEBORRHEIC KERATOSIS: ICD-10-CM

## 2025-03-26 DIAGNOSIS — B07.8 OTHER VIRAL WARTS: ICD-10-CM

## 2025-03-26 DIAGNOSIS — L82.1 OTHER SEBORRHEIC KERATOSIS: ICD-10-CM

## 2025-03-26 PROCEDURE — 99202 OFFICE O/P NEW SF 15 MIN: CPT | Mod: 25

## 2025-03-26 PROCEDURE — OTHER MIPS QUALITY: OTHER

## 2025-03-26 PROCEDURE — OTHER COUNSELING: OTHER

## 2025-03-26 PROCEDURE — 17110 DESTRUCT B9 LESION 1-14: CPT

## 2025-03-26 PROCEDURE — OTHER BENIGN DESTRUCTION: OTHER

## 2025-03-26 ASSESSMENT — LOCATION DETAILED DESCRIPTION DERM
LOCATION DETAILED: LEFT CENTRAL LATERAL NECK
LOCATION DETAILED: RIGHT SUPERIOR LATERAL BUCCAL CHEEK
LOCATION DETAILED: RIGHT INFERIOR LATERAL NECK
LOCATION DETAILED: LEFT INFERIOR CENTRAL MALAR CHEEK
LOCATION DETAILED: LEFT INFERIOR LATERAL NECK
LOCATION DETAILED: RIGHT CENTRAL LATERAL NECK

## 2025-03-26 ASSESSMENT — LOCATION SIMPLE DESCRIPTION DERM
LOCATION SIMPLE: RIGHT ANTERIOR NECK
LOCATION SIMPLE: LEFT ANTERIOR NECK
LOCATION SIMPLE: LEFT CHEEK
LOCATION SIMPLE: RIGHT CHEEK
LOCATION SIMPLE: NECK

## 2025-03-26 ASSESSMENT — LOCATION ZONE DERM
LOCATION ZONE: FACE
LOCATION ZONE: NECK

## 2025-03-26 NOTE — PROCEDURE: BENIGN DESTRUCTION
Render Post-Care Instructions In Note?: yes
Detail Level: Detailed
Post-Care Instructions: I reviewed with the patient in detail post-care instructions. Patient is to wear sunprotection, and avoid picking at any of the treated lesions. Pt may apply Vaseline to crusted or scabbing areas.
Add 52 Modifier (Optional): no
Anesthesia Volume In Cc: 0.5
Medical Necessity Clause: This procedure was medically necessary because the lesions that were treated were:
Medical Necessity Information: It is in your best interest to select a reason for this procedure from the list below. All of these items fulfill various CMS LCD requirements except the new and changing color options.
Treatment Number (Will Not Render If 0): 1
Consent: The patient's consent was obtained including but not limited to risks of crusting, scabbing, blistering, scarring, darker or lighter pigmentary change, recurrence, incomplete removal and infection.

## 2025-04-23 ENCOUNTER — APPOINTMENT (OUTPATIENT)
Dept: URBAN - METROPOLITAN AREA CLINIC 254 | Age: 70
Setting detail: DERMATOLOGY
End: 2025-04-23

## 2025-04-23 DIAGNOSIS — L82.0 INFLAMED SEBORRHEIC KERATOSIS: ICD-10-CM

## 2025-04-23 DIAGNOSIS — B07.8 OTHER VIRAL WARTS: ICD-10-CM

## 2025-04-23 DIAGNOSIS — L81.0 POSTINFLAMMATORY HYPERPIGMENTATION: ICD-10-CM

## 2025-04-23 PROCEDURE — OTHER COUNSELING: OTHER

## 2025-04-23 PROCEDURE — OTHER BENIGN DESTRUCTION: OTHER

## 2025-04-23 PROCEDURE — OTHER ADDITIONAL NOTES: OTHER

## 2025-04-23 PROCEDURE — 17111 DESTRUCT LESION 15 OR MORE: CPT

## 2025-04-23 PROCEDURE — OTHER MIPS QUALITY: OTHER

## 2025-04-23 PROCEDURE — 99213 OFFICE O/P EST LOW 20 MIN: CPT | Mod: 25

## 2025-04-23 ASSESSMENT — LOCATION DETAILED DESCRIPTION DERM
LOCATION DETAILED: RIGHT INFERIOR LATERAL NECK
LOCATION DETAILED: LEFT SUPERIOR LATERAL NECK
LOCATION DETAILED: LEFT INFERIOR LATERAL NECK
LOCATION DETAILED: LEFT LATERAL MALAR CHEEK
LOCATION DETAILED: LEFT INFERIOR LATERAL MALAR CHEEK
LOCATION DETAILED: LEFT CLAVICULAR NECK
LOCATION DETAILED: RIGHT CLAVICULAR NECK
LOCATION DETAILED: RIGHT MID PREAURICULAR CHEEK
LOCATION DETAILED: LEFT INFERIOR ANTERIOR NECK
LOCATION DETAILED: RIGHT SUPERIOR LATERAL BUCCAL CHEEK
LOCATION DETAILED: RIGHT RIB CAGE
LOCATION DETAILED: LEFT CENTRAL MALAR CHEEK
LOCATION DETAILED: RIGHT INFERIOR ANTERIOR NECK
LOCATION DETAILED: LEFT SUPERIOR CENTRAL BUCCAL CHEEK
LOCATION DETAILED: RIGHT SUPERIOR LATERAL MALAR CHEEK
LOCATION DETAILED: LEFT LATERAL BUCCAL CHEEK
LOCATION DETAILED: LEFT INFERIOR CENTRAL MALAR CHEEK
LOCATION DETAILED: LEFT MID PREAURICULAR CHEEK
LOCATION DETAILED: RIGHT SUPERIOR ANTERIOR NECK
LOCATION DETAILED: LEFT INFERIOR PREAURICULAR CHEEK
LOCATION DETAILED: RIGHT INFERIOR LATERAL MALAR CHEEK

## 2025-04-23 ASSESSMENT — LOCATION ZONE DERM
LOCATION ZONE: TRUNK
LOCATION ZONE: NECK
LOCATION ZONE: FACE

## 2025-04-23 ASSESSMENT — LOCATION SIMPLE DESCRIPTION DERM
LOCATION SIMPLE: LEFT ANTERIOR NECK
LOCATION SIMPLE: LEFT CHEEK
LOCATION SIMPLE: RIGHT CHEEK
LOCATION SIMPLE: ABDOMEN
LOCATION SIMPLE: RIGHT ANTERIOR NECK

## 2025-04-23 NOTE — PROCEDURE: ADDITIONAL NOTES
Additional Notes: Recommended OTC hydrocortisone cream
Render Risk Assessment In Note?: no
Detail Level: Simple

## 2025-04-23 NOTE — HPI: SKIN LESION
What Type Of Note Output Would You Prefer (Optional)?: Standard Output
Is This A New Presentation, Or A Follow-Up?: Skin Lesion
Additional History: Patient notes that this spot has been present for about five months. She has been putting cream on this area to see if it will help but it is not going away. This area does not bleed. She does take insulin and is unsure if this is related as she used to use it in this spot consistently. Does endorse having a previous rash here that has since resolved.

## 2025-04-23 NOTE — PROCEDURE: BENIGN DESTRUCTION
Render Note In Bullet Format When Appropriate: No
Consent: The patient's consent was obtained including but not limited to risks of crusting, scabbing, blistering, scarring, darker or lighter pigmentary change, recurrence, incomplete removal and infection.
Medical Necessity Clause: This procedure was medically necessary because the lesions that were treated were:
Medical Necessity Information: It is in your best interest to select a reason for this procedure from the list below. All of these items fulfill various CMS LCD requirements except the new and changing color options.
Detail Level: Detailed
Anesthesia Volume In Cc: 0.5
Post-Care Instructions: I reviewed with the patient in detail post-care instructions. Patient is to wear sunprotection, and avoid picking at any of the treated lesions. Pt may apply Vaseline to crusted or scabbing areas.
Treatment Number (Will Not Render If 0): 0

## 2025-05-21 ENCOUNTER — APPOINTMENT (OUTPATIENT)
Dept: URBAN - METROPOLITAN AREA SURGERY 9 | Age: 70
Setting detail: DERMATOLOGY
End: 2025-05-21

## 2025-06-09 ENCOUNTER — APPOINTMENT (OUTPATIENT)
Dept: URBAN - METROPOLITAN AREA CLINIC 254 | Age: 70
Setting detail: DERMATOLOGY
End: 2025-06-10

## 2025-06-09 VITALS — HEIGHT: 63 IN | WEIGHT: 180 LBS

## 2025-06-09 DIAGNOSIS — L85.3 XEROSIS CUTIS: ICD-10-CM

## 2025-06-09 DIAGNOSIS — L82.0 INFLAMED SEBORRHEIC KERATOSIS: ICD-10-CM

## 2025-06-09 DIAGNOSIS — L81.0 POSTINFLAMMATORY HYPERPIGMENTATION: ICD-10-CM

## 2025-06-09 PROCEDURE — OTHER PRESCRIPTION: OTHER

## 2025-06-09 PROCEDURE — OTHER COUNSELING: OTHER

## 2025-06-09 PROCEDURE — 17111 DESTRUCT LESION 15 OR MORE: CPT

## 2025-06-09 PROCEDURE — 99213 OFFICE O/P EST LOW 20 MIN: CPT | Mod: 25

## 2025-06-09 PROCEDURE — OTHER PRESCRIPTION MEDICATION MANAGEMENT: OTHER

## 2025-06-09 PROCEDURE — OTHER BENIGN DESTRUCTION: OTHER

## 2025-06-09 RX ORDER — HYDROCORTISONE 25 MG/G
OINTMENT TOPICAL
Qty: 28.35 | Refills: 1 | Status: ERX | COMMUNITY
Start: 2025-06-09

## 2025-06-09 ASSESSMENT — LOCATION DETAILED DESCRIPTION DERM
LOCATION DETAILED: LEFT LATERAL BUCCAL CHEEK
LOCATION DETAILED: LEFT INFERIOR PREAURICULAR CHEEK
LOCATION DETAILED: LEFT LATERAL MALAR CHEEK
LOCATION DETAILED: LEFT MID PREAURICULAR CHEEK
LOCATION DETAILED: LEFT INFERIOR LATERAL NECK
LOCATION DETAILED: RIGHT SUPERIOR LATERAL BUCCAL CHEEK
LOCATION DETAILED: LEFT INFERIOR CENTRAL MALAR CHEEK
LOCATION DETAILED: RIGHT INFERIOR CENTRAL BUCCAL CHEEK
LOCATION DETAILED: RIGHT CLAVICULAR NECK
LOCATION DETAILED: LEFT SUPERIOR LATERAL NECK
LOCATION DETAILED: RIGHT SUPERIOR LATERAL MALAR CHEEK
LOCATION DETAILED: RIGHT INFERIOR LATERAL NECK
LOCATION DETAILED: RIGHT SUPERIOR ANTERIOR NECK
LOCATION DETAILED: LEFT CENTRAL MALAR CHEEK
LOCATION DETAILED: RIGHT LATERAL BUCCAL CHEEK
LOCATION DETAILED: RIGHT LOWER CUTANEOUS LIP
LOCATION DETAILED: LEFT CLAVICULAR NECK
LOCATION DETAILED: RIGHT MID PREAURICULAR CHEEK
LOCATION DETAILED: RIGHT INFERIOR ANTERIOR NECK
LOCATION DETAILED: LEFT INFERIOR LATERAL MALAR CHEEK
LOCATION DETAILED: LEFT SUPERIOR CENTRAL BUCCAL CHEEK
LOCATION DETAILED: RIGHT INFERIOR LATERAL MALAR CHEEK
LOCATION DETAILED: RIGHT RIB CAGE
LOCATION DETAILED: LEFT INFERIOR ANTERIOR NECK

## 2025-06-09 ASSESSMENT — LOCATION ZONE DERM
LOCATION ZONE: FACE
LOCATION ZONE: LIP
LOCATION ZONE: TRUNK
LOCATION ZONE: NECK

## 2025-06-09 ASSESSMENT — LOCATION SIMPLE DESCRIPTION DERM
LOCATION SIMPLE: LEFT ANTERIOR NECK
LOCATION SIMPLE: RIGHT CHEEK
LOCATION SIMPLE: RIGHT ANTERIOR NECK
LOCATION SIMPLE: LEFT CHEEK
LOCATION SIMPLE: RIGHT LIP
LOCATION SIMPLE: ABDOMEN

## 2025-07-16 ENCOUNTER — APPOINTMENT (OUTPATIENT)
Dept: URBAN - METROPOLITAN AREA CLINIC 254 | Age: 70
Setting detail: DERMATOLOGY
End: 2025-07-21

## 2025-07-16 VITALS — HEIGHT: 55 IN | WEIGHT: 180 LBS

## 2025-07-16 DIAGNOSIS — L91.8 OTHER HYPERTROPHIC DISORDERS OF THE SKIN: ICD-10-CM

## 2025-07-16 DIAGNOSIS — L82.0 INFLAMED SEBORRHEIC KERATOSIS: ICD-10-CM

## 2025-07-16 DIAGNOSIS — R21 RASH AND OTHER NONSPECIFIC SKIN ERUPTION: ICD-10-CM

## 2025-07-16 PROCEDURE — 11104 PUNCH BX SKIN SINGLE LESION: CPT

## 2025-07-16 PROCEDURE — 99213 OFFICE O/P EST LOW 20 MIN: CPT | Mod: 25

## 2025-07-16 PROCEDURE — 11201 RMVL SKIN TAGS EA ADDL 10: CPT

## 2025-07-16 PROCEDURE — OTHER SKIN TAG REMOVAL: OTHER

## 2025-07-16 PROCEDURE — OTHER COUNSELING: OTHER

## 2025-07-16 PROCEDURE — OTHER BIOPSY BY PUNCH METHOD: OTHER

## 2025-07-16 PROCEDURE — 11200 RMVL SKIN TAGS UP TO&INC 15: CPT | Mod: 59

## 2025-07-16 PROCEDURE — OTHER DEFER: OTHER

## 2025-07-16 ASSESSMENT — LOCATION DETAILED DESCRIPTION DERM
LOCATION DETAILED: RIGHT SUPERIOR ANTERIOR NECK
LOCATION DETAILED: RIGHT LATERAL ABDOMEN
LOCATION DETAILED: RIGHT CLAVICULAR NECK
LOCATION DETAILED: RIGHT INFERIOR LATERAL NECK
LOCATION DETAILED: LEFT INFERIOR LATERAL NECK
LOCATION DETAILED: RIGHT CLAVICULAR SKIN
LOCATION DETAILED: LEFT SUPERIOR LATERAL NECK
LOCATION DETAILED: LEFT CLAVICULAR SKIN
LOCATION DETAILED: LEFT INFERIOR CENTRAL MALAR CHEEK
LOCATION DETAILED: LEFT CLAVICULAR NECK

## 2025-07-16 ASSESSMENT — LOCATION SIMPLE DESCRIPTION DERM
LOCATION SIMPLE: RIGHT CLAVICULAR SKIN
LOCATION SIMPLE: LEFT CHEEK
LOCATION SIMPLE: LEFT ANTERIOR NECK
LOCATION SIMPLE: RIGHT ANTERIOR NECK
LOCATION SIMPLE: LEFT CLAVICULAR SKIN
LOCATION SIMPLE: ABDOMEN

## 2025-07-16 ASSESSMENT — LOCATION ZONE DERM
LOCATION ZONE: TRUNK
LOCATION ZONE: FACE
LOCATION ZONE: NECK

## 2025-08-04 ENCOUNTER — APPOINTMENT (OUTPATIENT)
Dept: URBAN - METROPOLITAN AREA CLINIC 254 | Age: 70
Setting detail: DERMATOLOGY
End: 2025-08-04

## 2025-08-04 VITALS — WEIGHT: 180 LBS | HEIGHT: 55 IN

## 2025-08-04 DIAGNOSIS — L81.0 POSTINFLAMMATORY HYPERPIGMENTATION: ICD-10-CM

## 2025-08-04 DIAGNOSIS — L82.0 INFLAMED SEBORRHEIC KERATOSIS: ICD-10-CM

## 2025-08-04 DIAGNOSIS — Z48.02 ENCOUNTER FOR REMOVAL OF SUTURES: ICD-10-CM

## 2025-08-04 PROCEDURE — OTHER SUTURE REMOVAL (NO GLOBAL PERIOD): OTHER

## 2025-08-04 PROCEDURE — OTHER PRESCRIPTION MEDICATION MANAGEMENT: OTHER

## 2025-08-04 PROCEDURE — OTHER PRESCRIPTION: OTHER

## 2025-08-04 PROCEDURE — OTHER COUNSELING: OTHER

## 2025-08-04 PROCEDURE — 99212 OFFICE O/P EST SF 10 MIN: CPT | Mod: 25

## 2025-08-04 PROCEDURE — 17111 DESTRUCT LESION 15 OR MORE: CPT

## 2025-08-04 PROCEDURE — OTHER BENIGN DESTRUCTION: OTHER

## 2025-08-04 RX ORDER — HYDROQUINONE 40 MG/G
4% CREAM TOPICAL BID
Qty: 28.35 | Refills: 2 | Status: ERX | COMMUNITY
Start: 2025-08-04

## 2025-08-04 ASSESSMENT — LOCATION DETAILED DESCRIPTION DERM
LOCATION DETAILED: LEFT INFERIOR LATERAL NECK
LOCATION DETAILED: RIGHT SUPERIOR ANTERIOR NECK
LOCATION DETAILED: LEFT INFERIOR ANTERIOR NECK
LOCATION DETAILED: RIGHT RIB CAGE
LOCATION DETAILED: RIGHT INFERIOR ANTERIOR NECK
LOCATION DETAILED: RIGHT INFERIOR LATERAL NECK
LOCATION DETAILED: RIGHT LATERAL ABDOMEN
LOCATION DETAILED: RIGHT CLAVICULAR NECK

## 2025-08-04 ASSESSMENT — LOCATION SIMPLE DESCRIPTION DERM
LOCATION SIMPLE: NECK
LOCATION SIMPLE: RIGHT ANTERIOR NECK
LOCATION SIMPLE: ABDOMEN

## 2025-08-04 ASSESSMENT — LOCATION ZONE DERM
LOCATION ZONE: TRUNK
LOCATION ZONE: NECK

## 2025-09-03 ENCOUNTER — APPOINTMENT (OUTPATIENT)
Dept: URBAN - METROPOLITAN AREA CLINIC 254 | Age: 70
Setting detail: DERMATOLOGY
End: 2025-09-03

## 2025-09-03 DIAGNOSIS — L82.0 INFLAMED SEBORRHEIC KERATOSIS: ICD-10-CM

## 2025-09-03 PROCEDURE — 17111 DESTRUCT LESION 15 OR MORE: CPT

## 2025-09-03 PROCEDURE — OTHER LIQUID NITROGEN: OTHER

## 2025-09-03 PROCEDURE — OTHER COUNSELING: OTHER

## 2025-09-03 ASSESSMENT — LOCATION DETAILED DESCRIPTION DERM
LOCATION DETAILED: RIGHT LATERAL MALAR CHEEK
LOCATION DETAILED: LEFT SUPERIOR CENTRAL BUCCAL CHEEK
LOCATION DETAILED: LEFT INFERIOR LATERAL NECK
LOCATION DETAILED: LEFT INFERIOR LATERAL MALAR CHEEK
LOCATION DETAILED: RIGHT CENTRAL BUCCAL CHEEK
LOCATION DETAILED: LEFT INFERIOR ANTERIOR NECK
LOCATION DETAILED: RIGHT SUPERIOR CENTRAL BUCCAL CHEEK
LOCATION DETAILED: LEFT CENTRAL MALAR CHEEK
LOCATION DETAILED: RIGHT INFERIOR LATERAL MALAR CHEEK
LOCATION DETAILED: LEFT INFERIOR CENTRAL MALAR CHEEK
LOCATION DETAILED: LEFT CLAVICULAR NECK
LOCATION DETAILED: RIGHT SUPERIOR LATERAL NECK
LOCATION DETAILED: RIGHT SUPERIOR ANTERIOR NECK
LOCATION DETAILED: LEFT SUPERIOR CENTRAL MALAR CHEEK
LOCATION DETAILED: LEFT SUPERIOR LATERAL BUCCAL CHEEK
LOCATION DETAILED: LEFT SUPERIOR LATERAL NECK
LOCATION DETAILED: RIGHT INFERIOR LATERAL NECK
LOCATION DETAILED: RIGHT SUPERIOR LATERAL BUCCAL CHEEK
LOCATION DETAILED: RIGHT INFERIOR ANTERIOR NECK
LOCATION DETAILED: LEFT INFERIOR MEDIAL MALAR CHEEK
LOCATION DETAILED: RIGHT CLAVICULAR NECK

## 2025-09-03 ASSESSMENT — LOCATION SIMPLE DESCRIPTION DERM
LOCATION SIMPLE: RIGHT ANTERIOR NECK
LOCATION SIMPLE: LEFT ANTERIOR NECK
LOCATION SIMPLE: RIGHT CHEEK
LOCATION SIMPLE: NECK
LOCATION SIMPLE: LEFT CHEEK

## 2025-09-03 ASSESSMENT — LOCATION ZONE DERM
LOCATION ZONE: FACE
LOCATION ZONE: NECK